# Patient Record
Sex: MALE | Race: OTHER | URBAN - METROPOLITAN AREA
[De-identification: names, ages, dates, MRNs, and addresses within clinical notes are randomized per-mention and may not be internally consistent; named-entity substitution may affect disease eponyms.]

---

## 2023-06-05 ENCOUNTER — OFFICE VISIT (OUTPATIENT)
Dept: FAMILY MEDICINE CLINIC | Facility: CLINIC | Age: 68
End: 2023-06-05

## 2023-06-05 VITALS
BODY MASS INDEX: 31.36 KG/M2 | SYSTOLIC BLOOD PRESSURE: 109 MMHG | DIASTOLIC BLOOD PRESSURE: 72 MMHG | HEIGHT: 71 IN | WEIGHT: 224 LBS | HEART RATE: 99 BPM

## 2023-06-05 DIAGNOSIS — Z00.00 MEDICARE ANNUAL WELLNESS VISIT, SUBSEQUENT: Primary | ICD-10-CM

## 2023-06-05 DIAGNOSIS — E55.9 VITAMIN D DEFICIENCY: ICD-10-CM

## 2023-06-05 DIAGNOSIS — F17.210 CIGARETTE NICOTINE DEPENDENCE WITHOUT COMPLICATION: ICD-10-CM

## 2023-06-05 DIAGNOSIS — Z12.5 PROSTATE CANCER SCREENING: ICD-10-CM

## 2023-06-05 DIAGNOSIS — H71.90 CHOLESTEATOMA, UNSPECIFIED LATERALITY: ICD-10-CM

## 2023-06-05 DIAGNOSIS — H25.9 SENILE CATARACT, UNSPECIFIED AGE-RELATED CATARACT TYPE, UNSPECIFIED LATERALITY: ICD-10-CM

## 2023-06-05 DIAGNOSIS — N40.1 BENIGN PROSTATIC HYPERPLASIA WITH NOCTURIA: ICD-10-CM

## 2023-06-05 DIAGNOSIS — R35.1 BENIGN PROSTATIC HYPERPLASIA WITH NOCTURIA: ICD-10-CM

## 2023-06-05 DIAGNOSIS — Z12.11 COLON CANCER SCREENING: ICD-10-CM

## 2023-06-05 DIAGNOSIS — Z91.81 RISK FOR FALLS: ICD-10-CM

## 2023-06-05 RX ORDER — TAMSULOSIN HYDROCHLORIDE 0.4 MG/1
0.4 CAPSULE ORAL
COMMUNITY
End: 2023-06-05

## 2023-06-05 RX ORDER — TAMSULOSIN HYDROCHLORIDE 0.4 MG/1
0.4 CAPSULE ORAL DAILY
Qty: 90 CAPSULE | Refills: 2 | Status: SHIPPED | OUTPATIENT
Start: 2023-06-05

## 2023-06-08 ENCOUNTER — EKG ENCOUNTER (OUTPATIENT)
Dept: LAB | Age: 68
End: 2023-06-08
Attending: FAMILY MEDICINE
Payer: MEDICARE

## 2023-06-08 ENCOUNTER — LAB ENCOUNTER (OUTPATIENT)
Dept: LAB | Age: 68
End: 2023-06-08
Attending: FAMILY MEDICINE
Payer: MEDICARE

## 2023-06-08 DIAGNOSIS — R35.1 BENIGN PROSTATIC HYPERPLASIA WITH NOCTURIA: ICD-10-CM

## 2023-06-08 DIAGNOSIS — E55.9 VITAMIN D DEFICIENCY: ICD-10-CM

## 2023-06-08 DIAGNOSIS — N40.1 BENIGN PROSTATIC HYPERPLASIA WITH NOCTURIA: ICD-10-CM

## 2023-06-08 DIAGNOSIS — Z00.00 MEDICARE ANNUAL WELLNESS VISIT, SUBSEQUENT: ICD-10-CM

## 2023-06-08 LAB
ALBUMIN SERPL-MCNC: 3.8 G/DL (ref 3.4–5)
ALBUMIN/GLOB SERPL: 1 {RATIO} (ref 1–2)
ALP LIVER SERPL-CCNC: 86 U/L
ALT SERPL-CCNC: 28 U/L
ANION GAP SERPL CALC-SCNC: 4 MMOL/L (ref 0–18)
AST SERPL-CCNC: 27 U/L (ref 15–37)
ATRIAL RATE: 75 BPM
BASOPHILS # BLD AUTO: 0.1 X10(3) UL (ref 0–0.2)
BASOPHILS NFR BLD AUTO: 1.4 %
BILIRUB SERPL-MCNC: 0.9 MG/DL (ref 0.1–2)
BILIRUB UR QL: NEGATIVE
BUN BLD-MCNC: 14 MG/DL (ref 7–18)
BUN/CREAT SERPL: 14.1 (ref 10–20)
CALCIUM BLD-MCNC: 9.4 MG/DL (ref 8.5–10.1)
CHLORIDE SERPL-SCNC: 111 MMOL/L (ref 98–112)
CHOLEST SERPL-MCNC: 183 MG/DL (ref ?–200)
CLARITY UR: CLEAR
CO2 SERPL-SCNC: 29 MMOL/L (ref 21–32)
COLOR UR: YELLOW
CREAT BLD-MCNC: 0.99 MG/DL
DEPRECATED RDW RBC AUTO: 47 FL (ref 35.1–46.3)
EOSINOPHIL # BLD AUTO: 0.31 X10(3) UL (ref 0–0.7)
EOSINOPHIL NFR BLD AUTO: 4.3 %
ERYTHROCYTE [DISTWIDTH] IN BLOOD BY AUTOMATED COUNT: 13.1 % (ref 11–15)
EST. AVERAGE GLUCOSE BLD GHB EST-MCNC: 120 MG/DL (ref 68–126)
FASTING PATIENT LIPID ANSWER: YES
FASTING STATUS PATIENT QL REPORTED: YES
GFR SERPLBLD BASED ON 1.73 SQ M-ARVRAT: 84 ML/MIN/1.73M2 (ref 60–?)
GLOBULIN PLAS-MCNC: 3.7 G/DL (ref 2.8–4.4)
GLUCOSE BLD-MCNC: 88 MG/DL (ref 70–99)
GLUCOSE UR-MCNC: NORMAL MG/DL
HBA1C MFR BLD: 5.8 % (ref ?–5.7)
HCT VFR BLD AUTO: 50.9 %
HDLC SERPL-MCNC: 32 MG/DL (ref 40–59)
HGB BLD-MCNC: 16.6 G/DL
HGB UR QL STRIP.AUTO: NEGATIVE
IMM GRANULOCYTES # BLD AUTO: 0.02 X10(3) UL (ref 0–1)
IMM GRANULOCYTES NFR BLD: 0.3 %
KETONES UR-MCNC: NEGATIVE MG/DL
LDLC SERPL CALC-MCNC: 130 MG/DL (ref ?–100)
LEUKOCYTE ESTERASE UR QL STRIP.AUTO: NEGATIVE
LYMPHOCYTES # BLD AUTO: 2.52 X10(3) UL (ref 1–4)
LYMPHOCYTES NFR BLD AUTO: 34.9 %
MCH RBC QN AUTO: 31.9 PG (ref 26–34)
MCHC RBC AUTO-ENTMCNC: 32.6 G/DL (ref 31–37)
MCV RBC AUTO: 97.9 FL
MONOCYTES # BLD AUTO: 0.81 X10(3) UL (ref 0.1–1)
MONOCYTES NFR BLD AUTO: 11.2 %
NEUTROPHILS # BLD AUTO: 3.47 X10 (3) UL (ref 1.5–7.7)
NEUTROPHILS # BLD AUTO: 3.47 X10(3) UL (ref 1.5–7.7)
NEUTROPHILS NFR BLD AUTO: 47.9 %
NITRITE UR QL STRIP.AUTO: NEGATIVE
NONHDLC SERPL-MCNC: 151 MG/DL (ref ?–130)
OSMOLALITY SERPL CALC.SUM OF ELEC: 298 MOSM/KG (ref 275–295)
P AXIS: 53 DEGREES
P-R INTERVAL: 162 MS
PH UR: 5.5 [PH] (ref 5–8)
PLATELET # BLD AUTO: 255 10(3)UL (ref 150–450)
POTASSIUM SERPL-SCNC: 4.6 MMOL/L (ref 3.5–5.1)
PROT SERPL-MCNC: 7.5 G/DL (ref 6.4–8.2)
PROT UR-MCNC: NEGATIVE MG/DL
PSA SERPL-MCNC: 2.36 NG/ML (ref ?–4)
Q-T INTERVAL: 432 MS
QRS DURATION: 132 MS
QTC CALCULATION (BEZET): 482 MS
R AXIS: -6 DEGREES
RBC # BLD AUTO: 5.2 X10(6)UL
SODIUM SERPL-SCNC: 144 MMOL/L (ref 136–145)
SP GR UR STRIP: 1.02 (ref 1–1.03)
T AXIS: 71 DEGREES
T4 FREE SERPL-MCNC: 1.1 NG/DL (ref 0.8–1.7)
TRIGL SERPL-MCNC: 115 MG/DL (ref 30–149)
TSI SER-ACNC: 3.83 MIU/ML (ref 0.36–3.74)
UROBILINOGEN UR STRIP-ACNC: NORMAL
VENTRICULAR RATE: 75 BPM
VIT D+METAB SERPL-MCNC: 29.2 NG/ML (ref 30–100)
VLDLC SERPL CALC-MCNC: 21 MG/DL (ref 0–30)
WBC # BLD AUTO: 7.2 X10(3) UL (ref 4–11)

## 2023-06-08 PROCEDURE — 36415 COLL VENOUS BLD VENIPUNCTURE: CPT

## 2023-06-08 PROCEDURE — 84443 ASSAY THYROID STIM HORMONE: CPT

## 2023-06-08 PROCEDURE — 85025 COMPLETE CBC W/AUTO DIFF WBC: CPT

## 2023-06-08 PROCEDURE — 93005 ELECTROCARDIOGRAM TRACING: CPT

## 2023-06-08 PROCEDURE — 93010 ELECTROCARDIOGRAM REPORT: CPT | Performed by: INTERNAL MEDICINE

## 2023-06-08 PROCEDURE — 83036 HEMOGLOBIN GLYCOSYLATED A1C: CPT

## 2023-06-08 PROCEDURE — 84153 ASSAY OF PSA TOTAL: CPT

## 2023-06-08 PROCEDURE — 80061 LIPID PANEL: CPT

## 2023-06-08 PROCEDURE — 84439 ASSAY OF FREE THYROXINE: CPT

## 2023-06-08 PROCEDURE — 80053 COMPREHEN METABOLIC PANEL: CPT

## 2023-06-08 PROCEDURE — 82306 VITAMIN D 25 HYDROXY: CPT

## 2023-06-08 RX ORDER — ERGOCALCIFEROL 1.25 MG/1
50000 CAPSULE ORAL WEEKLY
Qty: 12 CAPSULE | Refills: 4 | Status: SHIPPED | OUTPATIENT
Start: 2023-06-08 | End: 2023-07-08

## 2023-08-03 ENCOUNTER — MED REC SCAN ONLY (OUTPATIENT)
Dept: FAMILY MEDICINE CLINIC | Facility: CLINIC | Age: 68
End: 2023-08-03

## 2023-12-14 ENCOUNTER — NURSE TRIAGE (OUTPATIENT)
Dept: FAMILY MEDICINE CLINIC | Facility: CLINIC | Age: 68
End: 2023-12-14

## 2023-12-14 NOTE — TELEPHONE ENCOUNTER
Please reply to pool: EM RN TRIAGE  Action Requested: Summary for Provider     []  Critical Lab, Recommendations Needed  [] Need Additional Advice  [x]   FYI    []   Need Orders  [] Need Medications Sent to Pharmacy  []  Other     SUMMARY: Patient contacts clinic reporting he fell 2 days ago and is now experiencing numbness to both hands and legs. He tripped because his shoes did not fit well,  He fell hitting his head and his knee. Denies loss of consciousness. Now with dizziness and numbness as described. Speech is clear. IC/ER evaluation recommended. Patient states he will have his son take him today at 6:00 pm.  RN advised evaluation ASAP. Dr. Edgardo Cordero.      Reason for call: Numbness  Onset: Data Unavailable                         Reason for Disposition   ACUTE NEUROLOGIC SYMPTOM and now fine    Protocols used: Head Injury-A-[place]

## 2023-12-18 NOTE — TELEPHONE ENCOUNTER
No call back from Pt. Has not read [employer]. No phone response will be mailed per process.

## 2023-12-21 ENCOUNTER — APPOINTMENT (OUTPATIENT)
Dept: CT IMAGING | Facility: HOSPITAL | Age: 68
End: 2023-12-21
Attending: EMERGENCY MEDICINE
Payer: MEDICARE

## 2023-12-21 ENCOUNTER — TELEPHONE (OUTPATIENT)
Dept: FAMILY MEDICINE CLINIC | Facility: CLINIC | Age: 68
End: 2023-12-21

## 2023-12-21 ENCOUNTER — OFFICE VISIT (OUTPATIENT)
Dept: FAMILY MEDICINE CLINIC | Facility: CLINIC | Age: 68
End: 2023-12-21

## 2023-12-21 ENCOUNTER — HOSPITAL ENCOUNTER (EMERGENCY)
Facility: HOSPITAL | Age: 68
Discharge: HOME | End: 2023-12-21
Attending: EMERGENCY MEDICINE
Payer: MEDICARE

## 2023-12-21 VITALS
HEART RATE: 87 BPM | WEIGHT: 210 LBS | TEMPERATURE: 98 F | HEIGHT: 71 IN | BODY MASS INDEX: 29.4 KG/M2 | DIASTOLIC BLOOD PRESSURE: 72 MMHG | RESPIRATION RATE: 18 BRPM | OXYGEN SATURATION: 97 % | SYSTOLIC BLOOD PRESSURE: 125 MMHG

## 2023-12-21 VITALS
DIASTOLIC BLOOD PRESSURE: 79 MMHG | SYSTOLIC BLOOD PRESSURE: 118 MMHG | BODY MASS INDEX: 31 KG/M2 | TEMPERATURE: 98 F | HEART RATE: 81 BPM | WEIGHT: 221 LBS

## 2023-12-21 DIAGNOSIS — R20.2 NUMBNESS AND TINGLING IN BOTH HANDS: ICD-10-CM
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
## 2023-12-21 DIAGNOSIS — T07.XXXA MULTIPLE CONTUSIONS: Primary | ICD-10-CM

## 2023-12-21 DIAGNOSIS — R68.89 FLU-LIKE SYMPTOMS: Primary | ICD-10-CM

## 2023-12-21 DIAGNOSIS — M54.2 NECK PAIN: ICD-10-CM

## 2023-12-21 DIAGNOSIS — R20.0 NUMBNESS AND TINGLING IN BOTH HANDS: ICD-10-CM

## 2023-12-21 LAB
ALBUMIN SERPL-MCNC: 4.5 G/DL (ref 3.2–4.8)
ALBUMIN/GLOB SERPL: 1.6 {RATIO} (ref 1–2)
ALP LIVER SERPL-CCNC: 91 U/L
ALT SERPL-CCNC: 22 U/L
ANION GAP SERPL CALC-SCNC: 10 MMOL/L (ref 0–18)
AST SERPL-CCNC: 23 U/L (ref ?–34)
BASOPHILS # BLD AUTO: 0.11 X10(3) UL (ref 0–0.2)
BASOPHILS NFR BLD AUTO: 1.5 %
BILIRUB SERPL-MCNC: 0.5 MG/DL (ref 0.2–1.1)
BUN BLD-MCNC: 11 MG/DL (ref 9–23)
BUN/CREAT SERPL: 12.4 (ref 10–20)
CALCIUM BLD-MCNC: 9.4 MG/DL (ref 8.7–10.4)
CHLORIDE SERPL-SCNC: 104 MMOL/L (ref 98–112)
CO2 SERPL-SCNC: 25 MMOL/L (ref 21–32)
CREAT BLD-MCNC: 0.89 MG/DL
DEPRECATED RDW RBC AUTO: 46.5 FL (ref 35.1–46.3)
EGFRCR SERPLBLD CKD-EPI 2021: 95 ML/MIN/1.73M2 (ref 60–?)
EOSINOPHIL # BLD AUTO: 0.28 X10(3) UL (ref 0–0.7)
EOSINOPHIL NFR BLD AUTO: 3.8 %
ERYTHROCYTE [DISTWIDTH] IN BLOOD BY AUTOMATED COUNT: 13.2 % (ref 11–15)
GLOBULIN PLAS-MCNC: 2.9 G/DL (ref 2.8–4.4)
GLUCOSE BLD-MCNC: 81 MG/DL (ref 70–99)
HCT VFR BLD AUTO: 48.1 %
HGB BLD-MCNC: 16.8 G/DL
IMM GRANULOCYTES # BLD AUTO: 0.02 X10(3) UL (ref 0–1)
IMM GRANULOCYTES NFR BLD: 0.3 %
LYMPHOCYTES # BLD AUTO: 2.65 X10(3) UL (ref 1–4)
LYMPHOCYTES NFR BLD AUTO: 35.5 %
MCH RBC QN AUTO: 32.9 PG (ref 26–34)
MCHC RBC AUTO-ENTMCNC: 34.9 G/DL (ref 31–37)
MCV RBC AUTO: 94.3 FL
MONOCYTES # BLD AUTO: 0.87 X10(3) UL (ref 0.1–1)
MONOCYTES NFR BLD AUTO: 11.7 %
NEUTROPHILS # BLD AUTO: 3.53 X10 (3) UL (ref 1.5–7.7)
NEUTROPHILS # BLD AUTO: 3.53 X10(3) UL (ref 1.5–7.7)
NEUTROPHILS NFR BLD AUTO: 47.2 %
OSMOLALITY SERPL CALC.SUM OF ELEC: 286 MOSM/KG (ref 275–295)
PLATELET # BLD AUTO: 250 10(3)UL (ref 150–450)
POTASSIUM SERPL-SCNC: 3.9 MMOL/L (ref 3.5–5.1)
PROT SERPL-MCNC: 7.4 G/DL (ref 5.7–8.2)
RBC # BLD AUTO: 5.1 X10(6)UL
SODIUM SERPL-SCNC: 139 MMOL/L (ref 136–145)
WBC # BLD AUTO: 7.5 X10(3) UL (ref 4–11)

## 2023-12-21 PROCEDURE — 85025 COMPLETE CBC W/AUTO DIFF WBC: CPT | Performed by: EMERGENCY MEDICINE

## 2023-12-21 PROCEDURE — 70450 CT HEAD/BRAIN W/O DYE: CPT | Performed by: EMERGENCY MEDICINE

## 2023-12-21 PROCEDURE — 99214 OFFICE O/P EST MOD 30 MIN: CPT

## 2023-12-21 PROCEDURE — 3078F DIAST BP <80 MM HG: CPT

## 2023-12-21 PROCEDURE — 80053 COMPREHEN METABOLIC PANEL: CPT | Performed by: EMERGENCY MEDICINE

## 2023-12-21 PROCEDURE — 99285 EMERGENCY DEPT VISIT HI MDM: CPT

## 2023-12-21 PROCEDURE — 3074F SYST BP LT 130 MM HG: CPT

## 2023-12-21 PROCEDURE — 1159F MED LIST DOCD IN RCRD: CPT

## 2023-12-21 PROCEDURE — 72125 CT NECK SPINE W/O DYE: CPT | Performed by: EMERGENCY MEDICINE

## 2023-12-21 PROCEDURE — 36415 COLL VENOUS BLD VENIPUNCTURE: CPT

## 2023-12-21 NOTE — ASSESSMENT & PLAN NOTE
Patient fell 2 weeks ago wearing soft son shoes who contrecoup injury to right upper forehead and abrasion to right knee. 3 days after fall patient reports numbness and tingling to bilateral hands thumb index and ring finger. C6 dermatome. Neuro eval negative in office Spurling test positive for the right side.

## 2023-12-21 NOTE — TELEPHONE ENCOUNTER
Patient contacts clinic requesting CT orders, seen today. Patient was instructed to proceed to ER for evaluation. Patient verbalized understanding.

## 2023-12-21 NOTE — ASSESSMENT & PLAN NOTE
Patient instructed to proceed to ER. Patient on agreeable at first.  When asked to sign AMA form patient agrees to go to ER. Patient proceed to ER for CT of the head CT C-spine for further evaluation.

## 2023-12-21 NOTE — PATIENT INSTRUCTIONS
You have a viral illness. Stay at home and rest. Avoid close contact with well people in your house so you won't make them sick. Drink plenty of water and other clear liquids to extra fluid loss. Treat fever, cough, and body aches with medicines you can buy at the store. For fever and pain, you may take Tylenol 650 mg every 4-6 hours as needed. If pain is still bothersome you may take 400 mg of ibuprofen every 4-6 hours as needed and rotate with Tylenol. If you have a lot of congestion you can try expectorants like guaifenesin. You can also try over-the-counter cough medicines as well (robitussin). If you stay sick for longer than 1 week please let me.

## 2023-12-21 NOTE — ASSESSMENT & PLAN NOTE
Neck pain s/p fall. Pt instructed to go to ER for eval and imaging. Patient aware of plan of care. All questions answered to satisfaction of the patient. Patient instructed to call office or reach out via [employer] if any issues arise. For urgent issues and/or reviewed red flags please proceed to the urgent care or ER. Also, inform the nurse practitioner with any new symptoms or medication side effects.

## 2023-12-21 NOTE — ED INITIAL ASSESSMENT (HPI)
Pt presents to ED for a fall 2 weeks ago. Pt c/o bilateral leg weakness and bilateral hand tingling x 1.5 weeks after his fall.

## 2023-12-21 NOTE — ED QUICK NOTES
Pt states fell about 2 1/2 weeks ago hitting his head. Denies LOC. States a coupe of days ago began to have weakness in bilateral legs and tingling in his fingers.

## 2023-12-22 LAB
FLUAV + FLUBV RNA SPEC NAA+PROBE: NOT DETECTED
FLUAV + FLUBV RNA SPEC NAA+PROBE: NOT DETECTED
RSV RNA SPEC NAA+PROBE: NOT DETECTED
SARS-COV-2 RNA RESP QL NAA+PROBE: NOT DETECTED

## 2023-12-27 ENCOUNTER — OFFICE VISIT (OUTPATIENT)
Dept: FAMILY MEDICINE CLINIC | Facility: CLINIC | Age: 68
End: 2023-12-27

## 2023-12-27 VITALS
HEIGHT: 71 IN | SYSTOLIC BLOOD PRESSURE: 104 MMHG | DIASTOLIC BLOOD PRESSURE: 65 MMHG | HEART RATE: 76 BPM | BODY MASS INDEX: 31.05 KG/M2 | WEIGHT: 221.81 LBS

## 2023-12-27 DIAGNOSIS — R20.2 PARESTHESIA OF BOTH HANDS: ICD-10-CM

## 2023-12-27 DIAGNOSIS — K05.6 PERIODONTAL DISEASE: ICD-10-CM
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
## 2023-12-27 PROBLEM — J41.0 SMOKERS' COUGH (HCC): Chronic | Status: ACTIVE | Noted: 2023-12-27

## 2023-12-27 PROCEDURE — 1159F MED LIST DOCD IN RCRD: CPT | Performed by: FAMILY MEDICINE

## 2023-12-27 PROCEDURE — 3078F DIAST BP <80 MM HG: CPT | Performed by: FAMILY MEDICINE

## 2023-12-27 PROCEDURE — 3074F SYST BP LT 130 MM HG: CPT | Performed by: FAMILY MEDICINE

## 2023-12-27 PROCEDURE — 3008F BODY MASS INDEX DOCD: CPT | Performed by: FAMILY MEDICINE

## 2023-12-27 PROCEDURE — 1125F AMNT PAIN NOTED PAIN PRSNT: CPT | Performed by: FAMILY MEDICINE

## 2023-12-27 PROCEDURE — 99213 OFFICE O/P EST LOW 20 MIN: CPT | Performed by: FAMILY MEDICINE

## 2024-01-08 ENCOUNTER — PROCEDURE VISIT (OUTPATIENT)
Dept: PHYSICAL MEDICINE AND REHAB | Facility: CLINIC | Age: 69
End: 2024-01-08
Payer: MEDICARE

## 2024-01-08 DIAGNOSIS — G56.13 MEDIAN NEUROPATHY OF BOTH UPPER EXTREMITIES: ICD-10-CM

## 2024-01-08 DIAGNOSIS — M54.12 CERVICAL RADICULOPATHY: Primary | ICD-10-CM

## 2024-01-08 PROCEDURE — 95886 MUSC TEST DONE W/N TEST COMP: CPT | Performed by: PHYSICAL MEDICINE & REHABILITATION

## 2024-01-08 PROCEDURE — 95910 NRV CNDJ TEST 7-8 STUDIES: CPT | Performed by: PHYSICAL MEDICINE & REHABILITATION
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
## 2024-01-09 PROBLEM — M54.12 CERVICAL RADICULOPATHY: Status: ACTIVE | Noted: 2024-01-09

## 2024-01-15 ENCOUNTER — OFFICE VISIT (OUTPATIENT)
Dept: FAMILY MEDICINE CLINIC | Facility: CLINIC | Age: 69
End: 2024-01-15
Payer: MEDICARE

## 2024-01-15 VITALS
HEART RATE: 85 BPM | BODY MASS INDEX: 30.8 KG/M2 | DIASTOLIC BLOOD PRESSURE: 78 MMHG | SYSTOLIC BLOOD PRESSURE: 122 MMHG | WEIGHT: 220 LBS | HEIGHT: 71 IN

## 2024-01-15 DIAGNOSIS — M54.41 ACUTE RIGHT-SIDED LOW BACK PAIN WITH RIGHT-SIDED SCIATICA: ICD-10-CM

## 2024-01-15 DIAGNOSIS — G56.03 BILATERAL CARPAL TUNNEL SYNDROME: Primary | ICD-10-CM

## 2024-01-15 PROCEDURE — L3908 WHO COCK-UP NONMOLDE PRE OTS: HCPCS | Performed by: FAMILY MEDICINE

## 2024-01-15 PROCEDURE — 3008F BODY MASS INDEX DOCD: CPT | Performed by: FAMILY MEDICINE

## 2024-01-15 PROCEDURE — 1159F MED LIST DOCD IN RCRD: CPT | Performed by: FAMILY MEDICINE

## 2024-01-15 PROCEDURE — 99213 OFFICE O/P EST LOW 20 MIN: CPT | Performed by: FAMILY MEDICINE

## 2024-01-15 PROCEDURE — 3074F SYST BP LT 130 MM HG: CPT | Performed by: FAMILY MEDICINE

## 2024-01-15 PROCEDURE — 3078F DIAST BP <80 MM HG: CPT | Performed by: FAMILY MEDICINE

## 2024-01-15 PROCEDURE — 1126F AMNT PAIN NOTED NONE PRSNT: CPT | Performed by: FAMILY MEDICINE

## 2024-01-15 RX ORDER — NAPROXEN 250 MG/1
250 TABLET ORAL 2 TIMES DAILY WITH MEALS
Qty: 60 TABLET | Refills: 0 | Status: SHIPPED | OUTPATIENT
Start: 2024-01-15

## 2024-01-15 NOTE — PROGRESS NOTES
1/15/2024  1:57 PM    Russell Ellis is a 68 year old male.    Chief complaint(s):   Chief Complaint   Patient presents with    Follow - Up     HPI:     Russell Ellis primary complaint is regarding Back - leg pain.       Patient is a 68-year-old male who presents for follow-up regarding bilateral upper extremity paresthesias and pains which has been pronounced most in the past month.  It seems that the right is worse than the left.  Recent EMG confirmed carpal tunnel syndrome.  However, he is not complaining that he has more of a back pain which is diffusely and it radiates to the right leg.  Patient was given the option of a wrist injection but he declined.    EMG Impression:  1.  Abnormal study.  2.  Electrodiagnostic evidence is consistent with bilateral median neuropathy at the wrist.  This is characterized by demyelination of sensory and motor fibers.  No axon loss.  3.  Electrodiagnostic evidence is suggestive but not diagnostic for chronic cervical polyradiculopathy.HISTORY:  History reviewed. No pertinent past medical history.   History reviewed. No pertinent surgical history.   History reviewed. No pertinent family history.   Social History:   Social History     Socioeconomic History    Marital status:    Tobacco Use    Smoking status: Some Days     Types: Cigarettes    Smokeless tobacco: Never   Substance and Sexual Activity    Alcohol use: Never        Immunizations:   Immunization History   Administered Date(s) Administered    Covid-19 Vaccine Moderna 100 mcg/0.5 ml 04/16/2021, 05/14/2021    Covid-19 Vaccine Moderna 50 Mcg/0.25 Ml 02/05/2022    Pneumococcal Conjugate PCV20 06/05/2023       Medications (Active prior to today's visit):  Current Outpatient Medications   Medication Sig Dispense Refill    naproxen 250 MG Oral Tab Take 1 tablet (250 mg total) by mouth 2 (two) times daily with meals. 60 tablet 0    tamsulosin 0.4 MG Oral Cap Take 1 capsule (0.4 mg total) by mouth daily. (Patient  not taking: Reported on 1/15/2024) 90 capsule 2       Allergies:  No Known Allergies      ROS:   Review of Systems   Constitutional:  Negative for appetite change, fatigue and fever.   Respiratory:  Negative for shortness of breath.    Cardiovascular:  Negative for chest pain.   Gastrointestinal:  Negative for abdominal pain.   Musculoskeletal:  Positive for back pain. Negative for myalgias.        UEs   Skin:  Negative for rash.   Neurological:  Positive for numbness (UEs). Negative for dizziness, weakness and headaches.       PHYSICAL EXAM:   VS: /78   Pulse 85   Ht 5' 11\" (1.803 m)   Wt 220 lb (99.8 kg)   BMI 30.68 kg/m²     Physical Exam  Vitals reviewed.   Constitutional:       Appearance: Normal appearance. He is well-developed.   HENT:      Head: Normocephalic.   Eyes:      General: No scleral icterus.     Conjunctiva/sclera: Conjunctivae normal.   Cardiovascular:      Rate and Rhythm: Normal rate.   Pulmonary:      Effort: Pulmonary effort is normal.   Musculoskeletal:      Cervical back: Neck supple.   Skin:     Findings: No rash.   Psychiatric:         Mood and Affect: Mood normal.         LABORATORY RESULTS:     EKG / Spirometry : -     Radiology: CT SPINE CERVICAL (CPT=72125)    Result Date: 12/21/2023  PROCEDURE: CT SPINE CERVICAL (CPT=72125)  COMPARISON: None.  INDICATIONS: fall/numbness in fingers R hand  TECHNIQUE:   Multi-planar CT images were obtained without intravenous contrast material.  Automated exposure control for dose reduction was used. Adjustment of the mA and/or kV was done based on the patient's size. Use of iterative reconstruction technique for dose reduction was used.  Dose information is transmitted to the ACR (American College of Radiology) NRDR (National Radiology Data Registry) which includes the Dose Index Registry.  Counting reference:  Craniocervical junction.   FINDINGS:  CRANIOCERVICAL AREA:   Normal foramen magnum with no Chiari malformation. PARASPINAL  AREA: Well-corticated osseous densities in the posterior paraspinal musculature, sequela of remote injury. BONES: There are hypertrophic changes of the uncovertebral and facet joints bilaterally. Small anterior endplate osteophytes seen within the cervical vertebral bodies. There is no acute fracture or dislocation. ALIGNMENT: There is loss of the cervical lordosis can be secondary from muscle spasm or positioning.  CERVICAL DISC LEVELS:  Multifocal areas of mild to moderate canal and foraminal narrowing secondary to degenerative facet, uncovertebral and disc disease.  No high-grade canal or foraminal narrowing.         CONCLUSION:   Degenerative disc, facet, uncovertebral joint disease throughout the cervical spine without acute fracture or dislocation..    Dictated by (CST): Rajiv Vasquez MD on 12/21/2023 at 5:59 PM     Finalized by (CST): Rajiv Vasquez MD on 12/21/2023 at 6:01 PM          CT BRAIN OR HEAD (66483)    Result Date: 12/21/2023  PROCEDURE: CT BRAIN OR HEAD (CPT=70450)  COMPARISON: None.  INDICATIONS: Loss of balance resulting in a fall.  fall/numbness in fingers R hand  TECHNIQUE: CT images were obtained without contrast material.  Automated exposure control for dose reduction was used.  Dose information is transmitted to the ACR (American College of Radiology) NRDR (National Radiology Data Registry) which includes the Dose Index Registry.  FINDINGS:  CSF SPACES: Ventricles, cisterns, and sulci are appropriate for age.  No hydrocephalus, subarachnoid hemorrhage, or mass.  No midline shift.  CEREBRUM: No edema, hemorrhage, mass, acute infarction, or significant atrophy.  CEREBELLUM: No edema, hemorrhage, mass, acute infarction, or significant atrophy.  BRAINSTEM: No edema, hemorrhage, mass, acute infarction, or significant atrophy.  CALVARIUM: No apparent fracture, mass, or other significant visible lesion.  SINUSES: Bilateral mastoidectomies.  Chronic mucoperiosteal thickening involving both ethmoid  sinuses, frontal sinus recess, sphenoid sinus, and bilateral maxillary sinus. ORBITS: Previous bilateral cataract surgery.  OTHER: Dystrophic dural calcifications.  Periodontal disease with a periapical abscess associated with a right posterior molar and erosive defect in the floor of the right maxillary sinus.  A fixation plate right clavicle seen on  view.         CONCLUSION:  1. No acute intracranial finding. 2. 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 3. Chronic pansinusitis. 4. Prior bilateral mastoidectomies.    Dictated by (CST): Yasmany Aj MD on 12/21/2023 at 4:55 PM     Finalized by (CST): Yasmany Aj MD on 12/21/2023 at 5:00 PM             ASSESSMENT/PLAN:   Assessment   Encounter Diagnoses   Name Primary?    Bilateral carpal tunnel syndrome Yes    Acute right-sided low back pain with right-sided sciatica        Declined corticoid steroid injection to wrist area    MEDICATIONS:     Requested Prescriptions     Signed Prescriptions Disp Refills    naproxen 250 MG Oral Tab 60 tablet 0     Sig: Take 1 tablet (250 mg total) by mouth 2 (two) times daily with meals.     REFERRALS: PHYSIATRY - INTERNAL,       Procedures    PHYSIATRY - INTERNAL        RECOMMENDATIONS given include: Patient was reassured of  his medical condition and all questions and concerns were answered. Patient was informed to please, call our office with any new or further questions or concerns that may come up in the near future. Notify Dr Fajardo or the [facility] if there is a deterioration or worsening of the medical condition. Also, inform the doctor with any new symptoms or medications' side effects.  Use wrist support given all day.     FOLLOW-UP: Schedule a follow-up visit in  prn.            Orders This Visit:  No orders of the defined types were placed in this encounter.      Meds This Visit:  Requested Prescriptions     Signed Prescriptions Disp  Refills    naproxen 250 MG Oral Tab 60 tablet 0     Sig: Take 1 tablet (250 mg total) by mouth 2 (two) times daily with meals.       Imaging & Referrals:  PHYSIATRY - INTERNAL         JAZIEL NEELY MD

## 2024-01-31 ENCOUNTER — OFFICE VISIT (OUTPATIENT)
Dept: PHYSICAL MEDICINE AND REHAB | Facility: CLINIC | Age: 69
End: 2024-01-31
Payer: MEDICARE

## 2024-01-31 VITALS — OXYGEN SATURATION: 96 % | BODY MASS INDEX: 35.36 KG/M2 | WEIGHT: 220 LBS | HEIGHT: 66 IN

## 2024-01-31 DIAGNOSIS — S13.9XXA NECK SPRAIN, INITIAL ENCOUNTER: Primary | ICD-10-CM

## 2024-01-31 DIAGNOSIS — R20.2 PARESTHESIAS: ICD-10-CM

## 2024-01-31 PROCEDURE — 1159F MED LIST DOCD IN RCRD: CPT | Performed by: PHYSICAL MEDICINE & REHABILITATION

## 2024-01-31 PROCEDURE — 1126F AMNT PAIN NOTED NONE PRSNT: CPT | Performed by: PHYSICAL MEDICINE & REHABILITATION

## 2024-01-31 PROCEDURE — 99214 OFFICE O/P EST MOD 30 MIN: CPT | Performed by: PHYSICAL MEDICINE & REHABILITATION

## 2024-01-31 PROCEDURE — 3008F BODY MASS INDEX DOCD: CPT | Performed by: PHYSICAL MEDICINE & REHABILITATION
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
## 2024-02-12 RX ORDER — NAPROXEN 250 MG/1
250 TABLET ORAL 2 TIMES DAILY WITH MEALS
Qty: 180 TABLET | Refills: 0 | Status: SHIPPED | OUTPATIENT
Start: 2024-02-12

## 2024-02-12 NOTE — TELEPHONE ENCOUNTER
Refill passed per [facility] protocol.  Requested Prescriptions   Pending Prescriptions Disp Refills    NAPROXEN 250 MG Oral Tab [Pharmacy Med Name: NAPROXEN 250 MG TABLET] 60 tablet 0     Sig: TAKE 1 TABLET BY MOUTH 2 TIMES DAILY WITH MEALS.       Non-Narcotic Pain Medication Protocol Passed - 2/11/2024  2:19 AM        Passed - In person appointment or virtual visit in the past 6 mos or appointment in next 3 mos     Recent Outpatient Visits              1 week ago [facility]    Office Visit    4 weeks ago Bilateral carpal tunnel syndrome    [facility]    Office Visit    1 month ago [facility], [facility]    Office Visit    1 month ago [facility]-like symptoms    [facility]    Office Visit    8 months ago Medicare annual wellness visit, subsequent    [facility] Ric[facility]    Office Visit          Future Appointments         Provider Department Appt Notes    In 1 week Elias Garcia APRN [facility], [address], [place] pre op                  Recent Outpatient Visits              1 week ago Neck sprain, initial encounter    [facility], La[facility] Santiago MD    Office Visit    4 weeks ago Bilateral carpal tunnel syndrome    [facility] Ric[facility]    Office Visit    1 month ago Fall, initial encounter    [facility] Ric[facility]    Office Visit    1 month ago Flu-like symptoms    [facility]    Office Visit    8 months ago Medicare annual wellness visit,  subsequent    [facility]    Office Visit          Future Appointments         Provider Department Appt Notes    In 1 week Elias Garcia, [facility], [address], [place] pre op

## 2024-02-18 PROBLEM — Z01.818 PREOPERATIVE EXAMINATION, UNSPECIFIED: Status: ACTIVE | Noted: 2024-02-18

## 2024-02-18 NOTE — PROGRESS NOTES
Russell Ellis is a 68 year old male who presents for a pre-operative physical exam.   Russell Ellis is scheduled for a anterior cervical discectomy with fusion C3-C6 procedure at [facility] on 03/21/24 performed by Dr Rancho Wynn. Indication: Cervical spondylotic myelopathy with myelomalacia    HPI related to surgery:   BPH and bilateral CTS.    He  has had previous anesthesia:  Yes.  Previous complications:  No    Social History     Socioeconomic History    Marital status:    Tobacco Use    Smoking status: Some Days     Types: Cigarettes    Smokeless tobacco: Never   Substance and Sexual Activity    Alcohol use: Never      History reviewed. No pertinent past medical history.  History reviewed. No pertinent surgical history.  Allergies: No Known Allergies  Current Outpatient Medications   Medication Sig Dispense Refill    methylPREDNISolone (MEDROL) 4 MG Oral Tablet Therapy Pack As directed. 21 each 0    naproxen 250 MG Oral Tab Take 1 tablet (250 mg total) by mouth 2 (two) times daily with meals. (Patient not taking: Reported on 2/19/2024) 180 tablet 0    tamsulosin 0.4 MG Oral Cap Take 1 capsule (0.4 mg total) by mouth daily. (Patient not taking: Reported on 1/15/2024) 90 capsule 2        REVIEW OF SYSTEMS:   Negative, except per HPI.     PHYSICAL EXAM:   /82   Pulse 78   Ht 5' 6\" (1.676 m)   Wt 218 lb (98.9 kg)   BMI 35.19 kg/m²    General appearance: alert and orient x3  HEENT: Normocephalic, without obvious abnormality, atraumatic. PERRL, EOMI, pink conjunctiva.   Neck: supple, symmetrical, trachea midline, no adenopathy, no JVD and thyroid not enlarged, symmetric, no tenderness/mass/nodules  Lungs: respirations unlabored, clear to auscultation bilaterally  Heart: regular rate and rhythm, S1, S2 normal, no murmur  Abdomen: normoactive bowel sounds x4; soft, non-distended; nontender; no masses  Extremities: extremities normal, atraumatic, no cyanosis or edema; no erythema or  tenderness in calves bilaterally  Pulses: 2+ and symmetric  Neurologic: alert, oriented x3; CN II-XII intact; no focal deficits.  Baseline numbness and weakness to BUE. Pain to right anterior upper arm in c5 dermatome. Patient with right sided sciatica pain down right leg. Positive straight leg raise right. Weak dorsiflexion great toe right side.     LABORATORY DATA:   See attached    ASSESSMENT AND PLAN:   Russell Ellis has no significant history of cardiac or pulmonary conditions.   He is a good surgical candidate and is medically cleared for surgery by myself and collaborating physician Dr. Megha Gusman.  This consult was sent back the referring physician, Dr. Rancho Wynn.    Assessment:  Encounter Diagnoses   Name Primary?    Preoperative examination, unspecified Yes    Cervical radiculopathy     Acute right-sided low back pain with right-sided sciatica      1. Preoperative examination, unspecified  - Comp Metabolic Panel (14); Future  - CBC With Differential With Platelet; Future  - Prothrombin Time (PT) [E]; Future  - PTT, Activated [E]; Future  - EKG 12 Lead; Future  - UA/M WITH CULTURE REFLEX [3020]  - XR CHEST AP/PA (1 VIEW) (CPT=71045); Future    CMP, CBC with dif, Pt, INR, PTT, UA, EKG, CXR and physical exam all completed in office today. Await results to clear.  Per paperwork, patient to obtain Type and screen at location of surgery. Levy bob will call to schedule.   PCP has no preference, consult surgeon's choice of hospitalist.     2. Cervical radiculopathy  CPM with neurosurgeon Tianna.  Plan for ACDF C3-C6    3. Acute right-sided low back pain with right-sided sciatica  Patient with C5 right arm pain and right sided sciatica pain.  Positive straight leg raise right  Weak dorsiflexion of right great toe.  Medrol dose pack x1  - methylPREDNISolone (MEDROL) 4 MG Oral Tablet Therapy Pack; As directed.  Dispense: 21 each; Refill: 0     Plan   Orders Placed This Encounter   Procedures     Comp Metabolic Panel (14)    CBC With Differential With Platelet    Prothrombin Time (PT) [E]    PTT, Activated [E]    UA/M WITH CULTURE REFLEX [3020]         Elias Garcia, APRN

## 2024-02-19 ENCOUNTER — OFFICE VISIT (OUTPATIENT)
Dept: FAMILY MEDICINE CLINIC | Facility: CLINIC | Age: 69
End: 2024-02-19

## 2024-02-19 VITALS
WEIGHT: 218 LBS | BODY MASS INDEX: 35.03 KG/M2 | HEART RATE: 78 BPM | DIASTOLIC BLOOD PRESSURE: 82 MMHG | SYSTOLIC BLOOD PRESSURE: 120 MMHG | HEIGHT: 66 IN

## 2024-02-19 DIAGNOSIS — Z01.818 PREOPERATIVE EXAMINATION, UNSPECIFIED: Primary | ICD-10-CM

## 2024-02-19 DIAGNOSIS — M54.41 ACUTE RIGHT-SIDED LOW BACK PAIN WITH RIGHT-SIDED SCIATICA: ICD-10-CM

## 2024-02-19 DIAGNOSIS — M54.12 CERVICAL RADICULOPATHY: ICD-10-CM

## 2024-02-19 PROCEDURE — 99214 OFFICE O/P EST MOD 30 MIN: CPT

## 2024-02-19 PROCEDURE — 1160F RVW MEDS BY RX/DR IN RCRD: CPT

## 2024-02-19 PROCEDURE — 3008F BODY MASS INDEX DOCD: CPT

## 2024-02-19 PROCEDURE — 3074F SYST BP LT 130 MM HG: CPT

## 2024-02-19 PROCEDURE — 3079F DIAST BP 80-89 MM HG: CPT

## 2024-02-19 PROCEDURE — 1159F MED LIST DOCD IN RCRD: CPT

## 2024-02-19 RX ORDER — METHYLPREDNISOLONE 4 MG/1
TABLET ORAL
Qty: 21 EACH | Refills: 0 | Status: SHIPPED | OUTPATIENT
Start: 2024-02-19

## 2024-02-19 NOTE — ASSESSMENT & PLAN NOTE
Patient with C5 right arm pain and right sided sciatica pain.  Positive straight leg raise right  Weak dorsiflexion of right great toe.  Medrol dose pack x1.

## 2024-02-19 NOTE — ASSESSMENT & PLAN NOTE
CMP, CBC with dif, Pt, INR, PTT, UA, EKG, CXR and physical exam all completed in office today. Await results to clear.  Per paperwork, patient to obtain Type and screen at location of surgery. Levy bob will call to schedule.   PCP has no preference, consult surgeon's choice of hospitalist.

## 2024-02-27 ENCOUNTER — NURSE TRIAGE (OUTPATIENT)
Dept: FAMILY MEDICINE CLINIC | Facility: CLINIC | Age: 69
End: 2024-02-27

## 2024-02-27 NOTE — TELEPHONE ENCOUNTER
Patient notified with understanding.  He will try naproxen.  Advised to stop 1 week prior to surgery.

## 2024-02-27 NOTE — TELEPHONE ENCOUNTER
Patient is scheduled for a anterior cervical discectomy with fusion C3-C6 procedure at [facility] on 03/21/24. Would recommend Naproxen first. Too soon to do an additional medrol back. Would reach out to neurosurgeon if they have any recommendations. We could always add a muscle relaxer if needed.    Thanks!

## 2024-02-27 NOTE — TELEPHONE ENCOUNTER
Please reply to pool: EM RN TRIAGE  Action Requested: Summary for Provider     []  Critical Lab, Recommendations Needed  [] Need Additional Advice  [x]   FYI    []   Need Orders  [] Need Medications Sent to Pharmacy  []  Other     SUMMARY: Patient contacts clinic reporting back and leg pain.  Seen 02/19 and prescribed a medrol dose pack which helped but has been finished for 2 days and pain has returned.  It is making him anxious. He is not taking naproxen. He plans to do MRI next week.  Inquires if medrol dose pack can be refilled or other suggestion.    Reason for call: Back Pain  Onset: Data Unavailable                       Reason for Disposition   Pain radiates into the thigh or further down the leg    Protocols used: Back Pain-A-[place]

## 2024-03-01 ENCOUNTER — HOSPITAL ENCOUNTER (OUTPATIENT)
Dept: GENERAL RADIOLOGY | Age: 69
Discharge: HOME | End: 2024-03-01
Payer: MEDICARE

## 2024-03-01 ENCOUNTER — LAB ENCOUNTER (OUTPATIENT)
Dept: LAB | Age: 69
End: 2024-03-01
Payer: MEDICARE

## 2024-03-01 ENCOUNTER — EKG ENCOUNTER (OUTPATIENT)
Dept: LAB | Age: 69
End: 2024-03-01
Payer: MEDICARE

## 2024-03-01 DIAGNOSIS — Z01.818 PREOPERATIVE EXAMINATION, UNSPECIFIED: ICD-10-CM

## 2024-03-01 LAB
ALBUMIN SERPL-MCNC: 4.2 G/DL (ref 3.2–4.8)
ALBUMIN/GLOB SERPL: 1.4 {RATIO} (ref 1–2)
ALP LIVER SERPL-CCNC: 85 U/L
ALT SERPL-CCNC: 19 U/L
ANION GAP SERPL CALC-SCNC: 5 MMOL/L (ref 0–18)
APTT PPP: 34.2 SECONDS (ref 23.3–35.6)
AST SERPL-CCNC: 20 U/L (ref ?–34)
ATRIAL RATE: 69 BPM
BASOPHILS # BLD AUTO: 0.09 X10(3) UL (ref 0–0.2)
BASOPHILS NFR BLD AUTO: 1.3 %
BILIRUB SERPL-MCNC: 0.8 MG/DL (ref 0.2–1.1)
BILIRUB UR QL: NEGATIVE
BUN BLD-MCNC: 11 MG/DL (ref 9–23)
BUN/CREAT SERPL: 10.7 (ref 10–20)
CALCIUM BLD-MCNC: 9.3 MG/DL (ref 8.7–10.4)
CHLORIDE SERPL-SCNC: 107 MMOL/L (ref 98–112)
CLARITY UR: CLEAR
CO2 SERPL-SCNC: 28 MMOL/L (ref 21–32)
CREAT BLD-MCNC: 1.03 MG/DL
DEPRECATED RDW RBC AUTO: 47.8 FL (ref 35.1–46.3)
EGFRCR SERPLBLD CKD-EPI 2021: 80 ML/MIN/1.73M2 (ref 60–?)
EOSINOPHIL # BLD AUTO: 0.25 X10(3) UL (ref 0–0.7)
EOSINOPHIL NFR BLD AUTO: 3.6 %
ERYTHROCYTE [DISTWIDTH] IN BLOOD BY AUTOMATED COUNT: 13.4 % (ref 11–15)
FASTING STATUS PATIENT QL REPORTED: YES
GLOBULIN PLAS-MCNC: 3.1 G/DL (ref 2.8–4.4)
GLUCOSE BLD-MCNC: 87 MG/DL (ref 70–99)
GLUCOSE UR-MCNC: NORMAL MG/DL
HCT VFR BLD AUTO: 49.3 %
HGB BLD-MCNC: 16.3 G/DL
HGB UR QL STRIP.AUTO: NEGATIVE
IMM GRANULOCYTES # BLD AUTO: 0.02 X10(3) UL (ref 0–1)
IMM GRANULOCYTES NFR BLD: 0.3 %
INR BLD: 0.95 (ref 0.8–1.2)
KETONES UR-MCNC: NEGATIVE MG/DL
LEUKOCYTE ESTERASE UR QL STRIP.AUTO: NEGATIVE
LYMPHOCYTES # BLD AUTO: 2.55 X10(3) UL (ref 1–4)
LYMPHOCYTES NFR BLD AUTO: 37.2 %
MCH RBC QN AUTO: 32.1 PG (ref 26–34)
MCHC RBC AUTO-ENTMCNC: 33.1 G/DL (ref 31–37)
MCV RBC AUTO: 97 FL
MONOCYTES # BLD AUTO: 0.72 X10(3) UL (ref 0.1–1)
MONOCYTES NFR BLD AUTO: 10.5 %
NEUTROPHILS # BLD AUTO: 3.23 X10 (3) UL (ref 1.5–7.7)
NEUTROPHILS # BLD AUTO: 3.23 X10(3) UL (ref 1.5–7.7)
NEUTROPHILS NFR BLD AUTO: 47.1 %
NITRITE UR QL STRIP.AUTO: NEGATIVE
OSMOLALITY SERPL CALC.SUM OF ELEC: 289 MOSM/KG (ref 275–295)
P AXIS: 50 DEGREES
P-R INTERVAL: 168 MS
PH UR: 6 [PH] (ref 5–8)
PLATELET # BLD AUTO: 247 10(3)UL (ref 150–450)
POTASSIUM SERPL-SCNC: 4.8 MMOL/L (ref 3.5–5.1)
PROT SERPL-MCNC: 7.3 G/DL (ref 5.7–8.2)
PROT UR-MCNC: NEGATIVE MG/DL
PROTHROMBIN TIME: 13.3 SECONDS (ref 11.6–14.8)
Q-T INTERVAL: 446 MS
QRS DURATION: 136 MS
QTC CALCULATION (BEZET): 477 MS
R AXIS: -28 DEGREES
RBC # BLD AUTO: 5.08 X10(6)UL
SODIUM SERPL-SCNC: 140 MMOL/L (ref 136–145)
SP GR UR STRIP: 1.01 (ref 1–1.03)
T AXIS: 99 DEGREES
UROBILINOGEN UR STRIP-ACNC: NORMAL
VENTRICULAR RATE: 69 BPM
WBC # BLD AUTO: 6.9 X10(3) UL (ref 4–11)

## 2024-03-01 PROCEDURE — 93010 ELECTROCARDIOGRAM REPORT: CPT | Performed by: INTERNAL MEDICINE

## 2024-03-01 PROCEDURE — 71046 X-RAY EXAM CHEST 2 VIEWS: CPT

## 2024-03-01 PROCEDURE — 36415 COLL VENOUS BLD VENIPUNCTURE: CPT

## 2024-03-01 PROCEDURE — 85610 PROTHROMBIN TIME: CPT

## 2024-03-01 PROCEDURE — 85730 THROMBOPLASTIN TIME PARTIAL: CPT

## 2024-03-01 PROCEDURE — 81003 URINALYSIS AUTO W/O SCOPE: CPT

## 2024-03-01 PROCEDURE — 93005 ELECTROCARDIOGRAM TRACING: CPT

## 2024-03-01 PROCEDURE — 85025 COMPLETE CBC W/AUTO DIFF WBC: CPT

## 2024-03-01 PROCEDURE — 80053 COMPREHEN METABOLIC PANEL: CPT

## 2024-03-15 ENCOUNTER — TELEPHONE (OUTPATIENT)
Dept: FAMILY MEDICINE CLINIC | Facility: CLINIC | Age: 69
End: 2024-03-15

## 2024-03-15 NOTE — TELEPHONE ENCOUNTER
Aidan from pre surgery at [facility] called and is requesting documentation they needs for patients surgery, is requesting medical history and physical Signed EKG    FAX: [phone]

## 2024-03-18 NOTE — TELEPHONE ENCOUNTER
Nevin from Dr. Wynn's , surgeon's office at [facility], is requesting the EKG Tracing for patient scheduled for surgery this Thursday, 3/21/24.    Rancho Rasmussen  Surgeon    FAX # [phone]  Phone # [phone]    Please advise

## 2024-03-19 NOTE — TELEPHONE ENCOUNTER
Romina from pre-admissions @ [facility] called stating they received the optimization form and they need the chest x-ray from 03/01/2024 and she needs  to sign the x-ray and fax it back to them. She states patient is scheduled for surgery on 03/21/2024.    Fax#: [phone]

## 2024-03-20 NOTE — TELEPHONE ENCOUNTER
Romina from pre-admission at [facility] calling to request update on below, stated patient has surgery tomorrow.

## 2024-03-25 RX ORDER — TAMSULOSIN HYDROCHLORIDE 0.4 MG/1
0.4 CAPSULE ORAL DAILY
Qty: 90 CAPSULE | Refills: 3 | Status: SHIPPED | OUTPATIENT
Start: 2024-03-25

## 2024-03-25 RX ORDER — IBUPROFEN 600 MG/1
600 TABLET ORAL EVERY 6 HOURS PRN
Refills: 0 | Status: CANCELLED | OUTPATIENT
Start: 2024-03-25

## 2024-03-25 NOTE — PROGRESS NOTES
Subjective:   Russell Ellis is a 69 year old male who presents for Surgical Followup (Check throat, had surgery last Thursday) and Nerves (Right arm, past year, )   Patient is a pleasant 69-year-old male with past medical history significant for BPH.  Patient presents to office today for evaluation of throat pain after surgery. Patient also having some arm pain to right arm since surgery. Throat pain is getting better but irritated in back of throat. Strength and Numbness and tingling improving but pain in right biceps since surgery.     Of note patient recently had anterior cervical discectomy with fusion C3-C6 procedure at [facility] on 03/21/24 performed by Dr Rancho Wynn. Howd that go? Everything is going well. Has follow up on 04/02/24.  Are you doing PT? Not started yet. Will ask about at follow up appointment. N NSAIDs, taking oxy 5mg qid prn and cyclobenzaprine 10mg tid x 7days.    Need calcium Ct score, tortuous aorta and cardiomegaly on cxr from preop.   ASCVD 19.7%  The 10-year ASCVD risk score (Girish COBIAN, et al., 2019) is: 20.5%    Values used to calculate the score:      Age: 69 years      Sex: Male      Is Non- : No      Diabetic: No      Tobacco smoker: Yes      Systolic Blood Pressure: 120 mmHg      Is BP treated: No      HDL Cholesterol: 32 mg/dL      Total Cholesterol: 183 mg/dL              History reviewed. No pertinent past medical history.   History reviewed. No pertinent surgical history.     History/Other:    Chief Complaint Reviewed and Verified  Nursing Notes Reviewed and   Verified  Tobacco Reviewed  Allergies Reviewed  Medications Reviewed    Problem List Reviewed  Medical History Reviewed  Surgical History   Reviewed  Family History Reviewed  Social History Reviewed         Tobacco:  He currently smokes tobacco.  Social History    Tobacco Use      Smoking status: Some Days        Types: Cigarettes      Smokeless tobacco: Never     Tobacco  Cessation Documentation (Smoking and Smokeless included):  I had an in depth therapy session with Russell Ellis about his tobacco use risks and options using the USPSTF's Five A's approach:    Ask: Russell is using tobacco products.  Assess: We asked about/assessed behavioral health risk and factors affecting choice of behavior change goals/methods.  Specifically I asked about readiness to quit tobacco.  Advise: We gave clear, specific, and personalized behavior change advice, including information about personal health harms and benefits. Specifically, he was told that Quitting tobacco is one of the best things he can do for his health. I strongly encouraged him to quit.      Agree: We collaboratively selected appropriate treatment goals and methods based on the patient’s interest in and willingness to change the behavior.   Assist: We used behavior change techniques (self-help and/or counseling), to aid Russell in achieving agreed-upon goals by acquiring the skills, confidence, and social/environmental supports for behavior change, supplemented with adjunctive medical treatments when appropriate. Additionally, national quitting tobacco aides were discussed.   Arrange: Follow up at next visit- see specific follow up below.    Time Counseled: <1 minutes       Current Outpatient Medications   Medication Sig Dispense Refill    oxyCODONE 5 MG Oral Tab       cyclobenzaprine 5 MG Oral Tab       Benzocaine-Menthol (CEPACOL) 15-2.3 MG Mouth/Throat Lozenge Use as directed 1 lozenge in the mouth or throat every 4 (four) hours as needed. 30 lozenge 0    tamsulosin 0.4 MG Oral Cap Take 1 capsule (0.4 mg total) by mouth daily. 90 capsule 3    methylPREDNISolone (MEDROL) 4 MG Oral Tablet Therapy Pack As directed. 21 each 0    naproxen 250 MG Oral Tab Take 1 tablet (250 mg total) by mouth 2 (two) times daily with meals. (Patient not taking: Reported on 2/19/2024) 180 tablet 0         Review of Systems:  Review of Systems    Constitutional: Negative.  Negative for activity change, chills and fever.   HENT:  Positive for sore throat. Negative for congestion, ear pain, postnasal drip, sinus pain and trouble swallowing.    Respiratory: Negative.  Negative for cough, shortness of breath and wheezing.    Cardiovascular: Negative.  Negative for chest pain and leg swelling.   Gastrointestinal: Negative.  Negative for abdominal pain, blood in stool, constipation and diarrhea.   Endocrine: Negative.    Genitourinary: Negative.  Negative for difficulty urinating, dysuria and flank pain.   Musculoskeletal:  Positive for arthralgias and myalgias. Negative for back pain and neck stiffness.   Skin: Negative.  Negative for color change and rash.   Neurological: Negative.  Negative for dizziness and headaches.   Hematological:  Negative for adenopathy.         Objective:   /77 (BP Location: Right arm, Patient Position: Sitting, Cuff Size: adult)   Pulse 90   Ht 5' 6\" (1.676 m)   Wt 216 lb (98 kg)   BMI 34.86 kg/m²  Estimated body mass index is 34.86 kg/m² as calculated from the following:    Height as of this encounter: 5' 6\" (1.676 m).    Weight as of this encounter: 216 lb (98 kg).  Physical Exam  Vitals and nursing note reviewed.   Constitutional:       Appearance: Normal appearance. He is normal weight.   HENT:      Head: Normocephalic.      Right Ear: External ear normal.      Left Ear: External ear normal.      Nose: Nose normal.      Mouth/Throat:      Mouth: Mucous membranes are moist.   Neck:      Comments: Anterior incision with steristrips, cdi.  Cardiovascular:      Rate and Rhythm: Normal rate and regular rhythm.      Pulses: Normal pulses.      Heart sounds: Normal heart sounds. No murmur heard.  Pulmonary:      Effort: Pulmonary effort is normal. No respiratory distress.      Breath sounds: Normal breath sounds. No wheezing.   Abdominal:      General: There is no distension.      Palpations: Abdomen is soft.      Tenderness:  There is no abdominal tenderness.   Musculoskeletal:         General: Normal range of motion.      Cervical back: Normal range of motion and neck supple.      Right lower leg: No edema.      Left lower leg: No edema.      Comments: Good strength upper extrem  Numbness and tingling improving.  Pain to right bicep from never innervation. Needs PT   Lymphadenopathy:      Cervical: No cervical adenopathy.   Skin:     General: Skin is warm and dry.      Capillary Refill: Capillary refill takes less than 2 seconds.      Findings: No rash.   Neurological:      General: No focal deficit present.      Mental Status: He is alert and oriented to person, place, and time.   Psychiatric:         Mood and Affect: Mood normal.         Behavior: Behavior normal.           Assessment & Plan:   1. Tortuous aorta (HCC) (Primary)  Assessment & Plan:  Found on CXR read from 3/1/24.  CT calcium score ordered.   Await results and refer to cards and start statin as needed.  Denies CP, SOB, ALLEN.  Orders:  -     CT CALCIUM SCORING; Standing  2. Sore throat  Assessment & Plan:  Having some throat irritation s/p intubation.  Educated on cool liquids, and comfort measures.  Can Purchase OTC cepacol.  If pain persists can refer to ENT for eval   Orders:  -     Cepacol; Use as directed 1 lozenge in the mouth or throat every 4 (four) hours as needed.  Dispense: 30 lozenge; Refill: 0  3. Postop check  Assessment & Plan:  Follow up appt on 4/2/24.  NO NSAIDs.  NO lifting over 5 lbs  Start PT at surgeon discretion.  Red flags reviewed.  4. Abnormal CXR  Assessment & Plan:  CXR completed on 3/1/24: Mild cardiomegaly.  Tortuous aorta Prominent bibasilar pulmonary markings most likely chronic.  No comparison chest films.   CT calcium score ordered.  Encouraged to schedule medicare physical   5. Cervical radiculopathy  Assessment & Plan:  Underwent ACDF C3-C6 on 3/21/24 with MD Wynn on 3/21/24.     6. Numbness and tingling in both hands  Assessment &  Plan:  Improved.  7. Right arm pain  Assessment & Plan:  Likely related to nerve innervation  Needs PT at surgeon discretion  Oxy as needed for pain  No NSAIDs.          Return if symptoms worsen or fail to improve, for Annual physical.    Elias Garcia, APRN, 3/25/2024, 12:54 PM

## 2024-03-25 NOTE — TELEPHONE ENCOUNTER
Future Appointments   Date Time Provider Department Center   3/26/2024 10:20 AM Elias Garcia APRN [facility]

## 2024-03-25 NOTE — TELEPHONE ENCOUNTER
Refill passed per [facility] protocol.    Requested Prescriptions   Pending Prescriptions Disp Refills    TAMSULOSIN 0.4 MG Oral Cap [Pharmacy Med Name: TAMSULOSIN HCL 0.4 MG CAPSULE] 90 capsule 2     Sig: TAKE 1 CAPSULE BY MOUTH EVERY DAY       Genitourinary Medications Passed - 3/23/2024 12:34 AM        Passed - Patient does not have pulmonary hypertension on problem list        Passed - In person appointment or virtual visit in the past 12 mos or appointment in next 3 mos     Recent Outpatient Visits              1 month ago Preoperative examination, unspecified    [facility], [facility], Elias Bean APRN    Office Visit    1 month ago Neck sprain, initial encounter    [facility], Laureano Santiago MD    Office Visit    2 months ago Bilateral carpal tunnel syndrome    [facility] [address], Car Cordero MD    Office Visit    2 months ago Fall, initial encounter    [facility], [address], Car Cordero MD    Office Visit    3 months ago Flu-like symptoms    [facility] [place] [facility]    Office Visit          Future Appointments         Provider Department Appt Notes    Tomorrow Elias Garcia APRN [facility], [facility], [place] time                  Future Appointments         Provider Department Appt Notes    Tomorrow Elias Garcia APRN [facility], [facility]          Recent Outpatient Visits              1 month ago [facility]operative examination, unspecified    [facility]    [facility] Visit    1 month ago Neck sprain, initial encounter    [facility]    Office Visit    2 months ago Bilateral carpal tunnel syndrome    [facility],  [facility], Car Cordero MD    Office Visit    2 months ago [facility]    Office Visit    3 months ago Flu-like symptoms    [facility], [address], Elias Bean APRN    Office Visit

## 2024-03-25 NOTE — TELEPHONE ENCOUNTER
Please review.    Medication is listed as patient reported.  Requested Prescriptions   Pending Prescriptions Disp Refills    ibuprofen 600 MG Oral Tab  0     Sig: Take 1 tablet (600 mg total) by mouth every 6 (six) hours as needed for Pain.       Non-Narcotic Pain Medication Protocol Passed - 3/25/2024 12:57 PM        Passed - In person appointment or virtual visit in the past 6 mos or appointment in next 3 mos     Recent Outpatient Visits              1 month ago Preoperative examination, unspecified    [facility], Elias Bean APRN    Office Visit    1 month ago Neck sprain, initial encounter    [facility], Laureano Santiago MD    Office Visit    2 months ago Bilateral carpal tunnel syndrome    [facility] [address], Car Cordero MD    Office Visit    2 months ago Fall, initial encounter    [facility], Car Cordero MD    Office Visit    3 months ago Flu-like symptoms    [facility] [place] [facility]    Office Visit          Future Appointments         Provider Department Appt Notes    Tomorrow Elias Garcia APRN [facility], [facility], [place] time                    Future Appointments         Provider Department Appt Notes    Tomorrow Elias Garcia APRN [facility], [facility], [place] time          Recent Outpatient Visits              1 month ago Preoperative examination, unspecified    [facility], Elias Bean APRN    Office Visit    1 month ago Neck sprain, initial encounter    [facility], Laureano Santiago MD    Office Visit    2 months ago Bilateral carpal tunnel syndrome    [facility], Car Cordero MD    Office Visit    2 months  ago Fall, initial encounter    [facility], [address], Car Cordero MD    Office Visit    3 months ago Flu-like symptoms    [facility], [address], Elias Bean APRN    Office Visit

## 2024-03-26 ENCOUNTER — OFFICE VISIT (OUTPATIENT)
Dept: FAMILY MEDICINE CLINIC | Facility: CLINIC | Age: 69
End: 2024-03-26

## 2024-03-26 VITALS
BODY MASS INDEX: 34.72 KG/M2 | SYSTOLIC BLOOD PRESSURE: 124 MMHG | HEART RATE: 90 BPM | DIASTOLIC BLOOD PRESSURE: 77 MMHG | HEIGHT: 66 IN | WEIGHT: 216 LBS

## 2024-03-26 DIAGNOSIS — R93.89 ABNORMAL CXR: ICD-10-CM

## 2024-03-26 DIAGNOSIS — M79.601 RIGHT ARM PAIN: ICD-10-CM

## 2024-03-26 DIAGNOSIS — R20.2 NUMBNESS AND TINGLING IN BOTH HANDS: ICD-10-CM

## 2024-03-26 DIAGNOSIS — Z09 POSTOP CHECK: ICD-10-CM

## 2024-03-26 DIAGNOSIS — I77.1 TORTUOUS AORTA (HCC): Primary | ICD-10-CM

## 2024-03-26 DIAGNOSIS — J02.9 SORE THROAT: ICD-10-CM

## 2024-03-26 DIAGNOSIS — M54.12 CERVICAL RADICULOPATHY: ICD-10-CM

## 2024-03-26 DIAGNOSIS — R20.0 NUMBNESS AND TINGLING IN BOTH HANDS: ICD-10-CM

## 2024-03-26 PROCEDURE — 3074F SYST BP LT 130 MM HG: CPT

## 2024-03-26 PROCEDURE — 3008F BODY MASS INDEX DOCD: CPT

## 2024-03-26 PROCEDURE — 99214 OFFICE O/P EST MOD 30 MIN: CPT

## 2024-03-26 PROCEDURE — 1126F AMNT PAIN NOTED NONE PRSNT: CPT

## 2024-03-26 PROCEDURE — 3078F DIAST BP <80 MM HG: CPT

## 2024-03-26 PROCEDURE — 1159F MED LIST DOCD IN RCRD: CPT

## 2024-03-26 PROCEDURE — 1170F FXNL STATUS ASSESSED: CPT

## 2024-03-26 RX ORDER — OXYCODONE HYDROCHLORIDE 5 MG/1
TABLET ORAL
COMMUNITY
Start: 2024-03-23

## 2024-03-26 RX ORDER — BENZOCAINE AND MENTHOL, UNSPECIFIED FORM 15; 2.3 MG/1; MG/1
1 LOZENGE ORAL EVERY 4 HOURS PRN
Qty: 30 LOZENGE | Refills: 0 | Status: SHIPPED | OUTPATIENT
Start: 2024-03-26

## 2024-03-26 RX ORDER — CYCLOBENZAPRINE HCL 5 MG
TABLET ORAL
COMMUNITY
Start: 2024-03-23

## 2024-03-26 NOTE — ASSESSMENT & PLAN NOTE
Found on CXR read from 3/1/24.  CT calcium score ordered.   Await results and refer to cards and start statin as needed.  Denies CP, SOB, ALLEN.

## 2024-03-26 NOTE — ASSESSMENT & PLAN NOTE
Having some throat irritation s/p intubation.  Educated on cool liquids, and comfort measures.  Can Purchase OTC cepacol.  If pain persists can refer to ENT for eval

## 2024-03-26 NOTE — ASSESSMENT & PLAN NOTE
Likely related to nerve innervation  Needs PT at surgeon discretion  Oxy as needed for pain  No NSAIDs.

## 2024-03-26 NOTE — ASSESSMENT & PLAN NOTE
Follow up appt on 4/2/24.  NO NSAIDs.  NO lifting over 5 lbs  Start PT at surgeon discretion.  Red flags reviewed.

## 2024-03-26 NOTE — ASSESSMENT & PLAN NOTE
CXR completed on 3/1/24: Mild cardiomegaly.  Tortuous aorta Prominent bibasilar pulmonary markings most likely chronic.  No comparison chest films.   CT calcium score ordered.  Encouraged to schedule medicare physical

## 2024-03-28 NOTE — TELEPHONE ENCOUNTER
UPDATE:  Per OV with Elias TOTH, sx procedure at [facility] on 03/21/24 performed by Dr Rancho Wynn.

## 2024-06-06 ENCOUNTER — OFFICE VISIT (OUTPATIENT)
Dept: FAMILY MEDICINE CLINIC | Facility: CLINIC | Age: 69
End: 2024-06-06

## 2024-06-06 VITALS
HEART RATE: 98 BPM | SYSTOLIC BLOOD PRESSURE: 121 MMHG | HEIGHT: 66 IN | BODY MASS INDEX: 34.93 KG/M2 | DIASTOLIC BLOOD PRESSURE: 73 MMHG | WEIGHT: 217.38 LBS

## 2024-06-06 DIAGNOSIS — M54.41 ACUTE RIGHT-SIDED LOW BACK PAIN WITH RIGHT-SIDED SCIATICA: ICD-10-CM

## 2024-06-06 DIAGNOSIS — I77.1 TORTUOUS AORTA (HCC): ICD-10-CM

## 2024-06-06 DIAGNOSIS — R09.A2 GLOBUS SENSATION: ICD-10-CM

## 2024-06-06 DIAGNOSIS — R20.2 NUMBNESS AND TINGLING IN BOTH HANDS: Primary | ICD-10-CM

## 2024-06-06 DIAGNOSIS — R20.0 NUMBNESS AND TINGLING IN BOTH HANDS: Primary | ICD-10-CM

## 2024-06-06 PROBLEM — E66.01 SEVERE OBESITY (BMI 35.0-39.9) WITH COMORBIDITY (HCC): Chronic | Status: ACTIVE | Noted: 2024-06-06

## 2024-06-06 PROCEDURE — 1160F RVW MEDS BY RX/DR IN RCRD: CPT

## 2024-06-06 PROCEDURE — 3074F SYST BP LT 130 MM HG: CPT

## 2024-06-06 PROCEDURE — 1159F MED LIST DOCD IN RCRD: CPT

## 2024-06-06 PROCEDURE — 1125F AMNT PAIN NOTED PAIN PRSNT: CPT

## 2024-06-06 PROCEDURE — 3008F BODY MASS INDEX DOCD: CPT

## 2024-06-06 PROCEDURE — 3078F DIAST BP <80 MM HG: CPT

## 2024-06-06 PROCEDURE — 99213 OFFICE O/P EST LOW 20 MIN: CPT

## 2024-06-06 RX ORDER — CYCLOBENZAPRINE HCL 5 MG
5 TABLET ORAL NIGHTLY PRN
Qty: 30 TABLET | Refills: 0 | Status: SHIPPED | OUTPATIENT
Start: 2024-06-06 | End: 2024-07-06

## 2024-06-06 NOTE — ASSESSMENT & PLAN NOTE
Still having back issues.  Not cleared for PT yet.  Requesting refill of cyclobenzaprine as needed.

## 2024-06-06 NOTE — ASSESSMENT & PLAN NOTE
New sensation since surgery of food getting stuck.  No reflux symptoms.  Nothing visible on exam.  Referral to ENT for scope.

## 2024-06-06 NOTE — PROGRESS NOTES
Subjective:   Russell Ellis is a 69 year old male who presents for Arm Pain (Past 3 months, needs refill on certain meds)   Patient is a pleasant 69-year-old male with past medical history significant for BPH.  Patient presents to office today for evaluation of problem with arms. He is following up with Tianna on 24. He has not started PT yet. He is curious why after surgery he is still having numbness and tingling on first 3 digits of bilateral hands. Also still having an issue swallowing since surgery, feels food gets stuck in throat. Denies reflux, burning and cough. Ever since anterior discectomy.     Of note patient recently had anterior cervical discectomy with fusion C3-C6 procedure at [facility] on 24 performed by Dr Rancho Wynn.      Need calcium Ct score, tortuous aorta and cardiomegaly. Reinforced.   ASCVD 19.7%          History reviewed. No pertinent past medical history.   History reviewed. No pertinent surgical history.     History/Other:    Chief Complaint Reviewed and Verified  Nursing Notes Reviewed and   Verified  Tobacco Reviewed  Allergies Reviewed  Medications Reviewed    Problem List Reviewed  Medical History Reviewed  Surgical History   Reviewed  Family History Reviewed  Social History Reviewed         Tobacco:  He smoked tobacco in the past but quit greater than 12 months ago.  Social History     Tobacco Use   Smoking Status Former    Current packs/day: 0.00    Types: Cigarettes    Quit date: 2023    Years since quittin.6    Passive exposure: Never   Smokeless Tobacco Never        Current Outpatient Medications   Medication Sig Dispense Refill    cyclobenzaprine 5 MG Oral Tab Take 1 tablet (5 mg total) by mouth nightly as needed for Muscle spasms. 30 tablet 0    oxyCODONE 5 MG Oral Tab  (Patient not taking: Reported on 2024)      Benzocaine-Menthol (CEPACOL) 15-2.3 MG Mouth/Throat Lozenge Use as directed 1 lozenge in the mouth or throat  every 4 (four) hours as needed. (Patient not taking: Reported on 6/6/2024) 30 lozenge 0    tamsulosin 0.4 MG Oral Cap Take 1 capsule (0.4 mg total) by mouth daily. (Patient not taking: Reported on 6/6/2024) 90 capsule 3    methylPREDNISolone (MEDROL) 4 MG Oral Tablet Therapy Pack As directed. (Patient not taking: Reported on 6/6/2024) 21 each 0    naproxen 250 MG Oral Tab Take 1 tablet (250 mg total) by mouth 2 (two) times daily with meals. (Patient not taking: Reported on 2/19/2024) 180 tablet 0         Review of Systems:  Review of Systems   Constitutional: Negative.  Negative for activity change, chills and fever.   HENT: Negative.  Negative for congestion, ear pain, postnasal drip, sinus pain, sore throat and trouble swallowing.    Respiratory: Negative.  Negative for cough, shortness of breath and wheezing.    Cardiovascular: Negative.  Negative for chest pain and leg swelling.   Gastrointestinal: Negative.  Negative for abdominal pain, blood in stool, constipation and diarrhea.   Endocrine: Negative.    Genitourinary: Negative.  Negative for difficulty urinating, dysuria and flank pain.   Musculoskeletal: Negative.  Negative for arthralgias, back pain and neck stiffness.   Skin: Negative.  Negative for color change and rash.   Neurological:  Positive for weakness. Negative for dizziness and headaches.   Hematological:  Negative for adenopathy.         Objective:   /73 (BP Location: Right arm, Patient Position: Sitting, Cuff Size: large)   Pulse 98   Ht 5' 6\" (1.676 m)   Wt 217 lb 6.4 oz (98.6 kg)   BMI 35.09 kg/m²  Estimated body mass index is 35.09 kg/m² as calculated from the following:    Height as of this encounter: 5' 6\" (1.676 m).    Weight as of this encounter: 217 lb 6.4 oz (98.6 kg).  Physical Exam  Vitals and nursing note reviewed.   Constitutional:       Appearance: Normal appearance. He is normal weight.   HENT:      Head: Normocephalic.      Right Ear: Tympanic membrane, ear canal and  external ear normal.      Left Ear: Tympanic membrane, ear canal and external ear normal.      Nose: Rhinorrhea present. No congestion.      Mouth/Throat:      Mouth: Mucous membranes are moist.      Pharynx: No oropharyngeal exudate or posterior oropharyngeal erythema.   Eyes:      Extraocular Movements: Extraocular movements intact.      Pupils: Pupils are equal, round, and reactive to light.   Cardiovascular:      Rate and Rhythm: Normal rate and regular rhythm.      Pulses: Normal pulses.      Heart sounds: Normal heart sounds. No murmur heard.  Pulmonary:      Effort: Pulmonary effort is normal. No respiratory distress.      Breath sounds: Normal breath sounds. No wheezing.   Abdominal:      General: There is no distension.      Palpations: Abdomen is soft.      Tenderness: There is no abdominal tenderness.   Musculoskeletal:      Cervical back: Normal range of motion and neck supple.      Right lower leg: No edema.      Left lower leg: No edema.      Comments: Weaker right grasp  Numbness and tingling bilateral first 3 fingers.    Lymphadenopathy:      Cervical: No cervical adenopathy.   Skin:     General: Skin is warm and dry.      Capillary Refill: Capillary refill takes less than 2 seconds.      Findings: No rash.      Comments: Scar anterior neck   Neurological:      General: No focal deficit present.      Mental Status: He is alert and oriented to person, place, and time.   Psychiatric:         Mood and Affect: Mood normal.         Behavior: Behavior normal.           Assessment & Plan:   1. Numbness and tingling in both hands (Primary)  Assessment & Plan:  Underwent ACDF C3-C6 on 3/21/24 with MD Wynn on 3/21/24   Has not started PT yet.  Still with N/T and weakness  Follow up with surgeon 6/12  Better but still remains.  Orders:  -     Cyclobenzaprine HCl; Take 1 tablet (5 mg total) by mouth nightly as needed for Muscle spasms.  Dispense: 30 tablet; Refill: 0  2. Tortuous aorta (HCC)  Assessment &  Plan:  Encouraged patient again in office to obtain Calcium ct score.  Needs repeat lipids test as well.  Encourgaed to schedule physical  3. Acute right-sided low back pain with right-sided sciatica  Assessment & Plan:  Still having back issues.  Not cleared for PT yet.  Requesting refill of cyclobenzaprine as needed.  Orders:  -     Cyclobenzaprine HCl; Take 1 tablet (5 mg total) by mouth nightly as needed for Muscle spasms.  Dispense: 30 tablet; Refill: 0  4. Globus sensation  Assessment & Plan:  New sensation since surgery of food getting stuck.  No reflux symptoms.  Nothing visible on exam.  Referral to ENT for scope.  Orders:  -     ENT Referral - In Network        Return for Annual physical.    NAVNEET Palumbo, 6/5/2024, 8:29 PM

## 2024-06-06 NOTE — ASSESSMENT & PLAN NOTE
Underwent ACDF C3-C6 on 3/21/24 with MD Wynn on 3/21/24   Has not started PT yet.  Still with N/T and weakness  Follow up with surgeon 6/12

## 2024-06-06 NOTE — ASSESSMENT & PLAN NOTE
Encouraged patient again in office to obtain Calcium ct score.  Needs repeat lipids test as well.  Encourgaed to schedule physical

## 2024-06-06 NOTE — ASSESSMENT & PLAN NOTE
Underwent ACDF C3-C6 on 3/21/24 with MD Wynn on 3/21/24   Has not started PT yet.  Still with N/T and weakness  Follow up with surgeon 6/12  Better but still remains.

## 2024-06-25 NOTE — PROGRESS NOTES
Russell Ellis is a 69 year old male who presents for a pre-operative physical exam.   Russell Ellis is scheduled for a L3-S1 decompressive laminectomy procedure at [facility] on 7/15/24 performed by Dr Rancho Rodríguez. Indication: Paresthesia and weakness in lower extremities.    HPI related to surgery:   Patient previously had a anterior cervical discectomy with fusion C3-C6 procedure [facility] on 3/21/2024 for cervical spondylitic myelopathy with myeloomalacia. Now having issues in lower back with weakness. Primary focus was cervical and fixed, now next step.     He  has had previous anesthesia:  Yes.  Previous complications:  Yes    Social History     Socioeconomic History    Marital status:    Tobacco Use    Smoking status: Former     Current packs/day: 0.00     Types: Cigarettes     Quit date: 2023     Years since quittin.7     Passive exposure: Never    Smokeless tobacco: Never   Vaping Use    Vaping status: Never Used   Substance and Sexual Activity    Alcohol use: Never    Drug use: Never      Past Medical History:    Fall    Right arm pain    Sore throat     History reviewed. No pertinent surgical history.  Allergies: No Known Allergies  Current Outpatient Medications   Medication Sig Dispense Refill    cyclobenzaprine 5 MG Oral Tab Take 1 tablet (5 mg total) by mouth nightly as needed for Muscle spasms. (Patient not taking: Reported on 2024) 30 tablet 0    oxyCODONE 5 MG Oral Tab  (Patient not taking: Reported on 2024)      Benzocaine-Menthol (CEPACOL) 15-2.3 MG Mouth/Throat Lozenge Use as directed 1 lozenge in the mouth or throat every 4 (four) hours as needed. (Patient not taking: Reported on 2024) 30 lozenge 0    tamsulosin 0.4 MG Oral Cap Take 1 capsule (0.4 mg total) by mouth daily. (Patient not taking: Reported on 2024) 90 capsule 3    methylPREDNISolone (MEDROL) 4 MG Oral Tablet Therapy Pack As directed. (Patient not taking: Reported on 2024)  21 each 0    naproxen 250 MG Oral Tab Take 1 tablet (250 mg total) by mouth 2 (two) times daily with meals. (Patient not taking: Reported on 2/19/2024) 180 tablet 0        REVIEW OF SYSTEMS:   Review of Systems   Constitutional:  Negative for chills and fever.   HENT:  Negative for congestion and sore throat.    Respiratory:  Negative for cough and shortness of breath.    Cardiovascular:  Negative for chest pain and palpitations.   Gastrointestinal:  Negative for constipation, diarrhea, nausea and vomiting.   Musculoskeletal:  Positive for back pain.   Skin:  Negative for rash.   Neurological:  Positive for weakness. Negative for dizziness and tingling.   All other systems reviewed and are negative.        PHYSICAL EXAM:   /76 (BP Location: Right arm, Patient Position: Sitting, Cuff Size: large)   Pulse 98   Ht 5' 6\" (1.676 m)   Wt 216 lb 3.2 oz (98.1 kg)   BMI 34.90 kg/m²    Physical Exam  Vitals and nursing note reviewed.   Constitutional:       Appearance: Normal appearance. He is normal weight.   HENT:      Head: Normocephalic.      Right Ear: Tympanic membrane, ear canal and external ear normal.      Left Ear: Tympanic membrane, ear canal and external ear normal.      Ears:      Comments: Scarred TM right     Nose: Nose normal. No congestion or rhinorrhea.      Mouth/Throat:      Mouth: Mucous membranes are moist.      Pharynx: No oropharyngeal exudate or posterior oropharyngeal erythema.   Eyes:      Extraocular Movements: Extraocular movements intact.      Pupils: Pupils are equal, round, and reactive to light.   Cardiovascular:      Rate and Rhythm: Normal rate and regular rhythm.      Pulses: Normal pulses.      Heart sounds: Normal heart sounds. No murmur heard.  Pulmonary:      Effort: Pulmonary effort is normal. No respiratory distress.      Breath sounds: Normal breath sounds. No wheezing.   Abdominal:      General: There is no distension.      Palpations: Abdomen is soft.      Tenderness:  There is no abdominal tenderness.   Musculoskeletal:         General: Tenderness present. No swelling.      Cervical back: Normal range of motion and neck supple.      Right lower leg: No edema.      Left lower leg: No edema.      Comments: Positive straight leg raise.   Lymphadenopathy:      Cervical: No cervical adenopathy.   Skin:     General: Skin is warm and dry.      Capillary Refill: Capillary refill takes less than 2 seconds.      Findings: No rash.      Comments: Anterior neck scar well healed.    Neurological:      General: No focal deficit present.      Mental Status: He is alert and oriented to person, place, and time.   Psychiatric:         Mood and Affect: Mood normal.         Behavior: Behavior normal.          LABORATORY DATA:   Ordered CBC, CMP, PT, PTT, UA, CXR, EKG  See Attached    ASSESSMENT AND PLAN:   Russell Ellis has no significant history of cardiac or pulmonary conditions. He has new tortuous aorta on CXR that is being worked up with Calcium CT. He had this prior to previous procedure and still needs to schedule.    He is a good surgical candidate and is medically cleared for surgery by myself Elias TOTH and my collaborating physician Megha Gusman MD.  This consult was sent back the referring physician, Dr. Rancho Wynn    Assessment:    1. Preoperative examination, unspecified  Scheduled for L3-S1 decompressive laminectomy on 7/15/2024 with Dr. Rancho Rodríguez at [facility] in [facility].  CMP, CBC with differential, PT, PTT, UA, EKG, chest x-ray ordered  Type and screen must be completed location of surgery hospital will call patient with instructions  No preference for choice of attending MD, consult surgeons choice of hospitalist.  Cleared pending labs and workup.  Instructed to follow-up with physical and to obtain CT calcium score.  - Comp Metabolic Panel (14) [E]; Future  - CBC With Differential With Platelet; Future  - Prothrombin Time (PT)  [E]; Future  - PTT, Activated [E]; Future  - Urinalysis with Culture Reflex; Future  - EKG 12 Lead; Future  - XR CHEST PA + LAT CHEST (CPT=71046); Future    2. Tortuous aorta (HCC)  Found on chest x-ray 3/1/2024 before anterior cervical discectomy.  CT calcium score ordered  Patient encouraged to schedule again, also encouraged to schedule physical again to assess cholesterol.    3. Spinal stenosis of lumbar region, unspecified whether neurogenic claudication present  Scheduled for L3-S1 decompressive laminectomy at [facility] on 7/15/2024 with Dr. Rancho Rodríguez.  Reports weakness in lower extremities and legs giving out  Positive straight leg raise    4. Cervical radiculopathy  Improving status post anterior cervical discectomy with fusion C3-C6 on 3/21/2024  Same surgeon  Still with some weakness to right side improving  Still having some baseline numbness and tingling improving       5. Globus sensation  Resolving status post anterior cervical discectomy  Has referral to ENT if needed          NAVNEET Palumbo

## 2024-06-27 ENCOUNTER — MED REC SCAN ONLY (OUTPATIENT)
Dept: FAMILY MEDICINE CLINIC | Facility: CLINIC | Age: 69
End: 2024-06-27

## 2024-06-27 ENCOUNTER — OFFICE VISIT (OUTPATIENT)
Dept: FAMILY MEDICINE CLINIC | Facility: CLINIC | Age: 69
End: 2024-06-27

## 2024-06-27 ENCOUNTER — EKG ENCOUNTER (OUTPATIENT)
Dept: LAB | Age: 69
End: 2024-06-27

## 2024-06-27 ENCOUNTER — LAB ENCOUNTER (OUTPATIENT)
Dept: LAB | Age: 69
End: 2024-06-27

## 2024-06-27 ENCOUNTER — HOSPITAL ENCOUNTER (OUTPATIENT)
Dept: GENERAL RADIOLOGY | Age: 69
Discharge: HOME | End: 2024-06-27

## 2024-06-27 VITALS
DIASTOLIC BLOOD PRESSURE: 76 MMHG | HEART RATE: 98 BPM | WEIGHT: 216.19 LBS | HEIGHT: 66 IN | BODY MASS INDEX: 34.74 KG/M2 | SYSTOLIC BLOOD PRESSURE: 115 MMHG

## 2024-06-27 DIAGNOSIS — Z01.818 PREOPERATIVE EXAMINATION, UNSPECIFIED: ICD-10-CM

## 2024-06-27 DIAGNOSIS — M54.12 CERVICAL RADICULOPATHY: ICD-10-CM

## 2024-06-27 DIAGNOSIS — R09.A2 GLOBUS SENSATION: ICD-10-CM

## 2024-06-27 DIAGNOSIS — I77.1 TORTUOUS AORTA (HCC): ICD-10-CM

## 2024-06-27 DIAGNOSIS — Z01.818 PREOPERATIVE EXAMINATION, UNSPECIFIED: Primary | ICD-10-CM

## 2024-06-27 DIAGNOSIS — M48.061 SPINAL STENOSIS OF LUMBAR REGION, UNSPECIFIED WHETHER NEUROGENIC CLAUDICATION PRESENT: ICD-10-CM

## 2024-06-27 PROBLEM — J02.9 SORE THROAT: Status: RESOLVED | Noted: 2024-03-26 | Resolved: 2024-06-27

## 2024-06-27 PROBLEM — Z09 POSTOP CHECK: Status: RESOLVED | Noted: 2024-03-26 | Resolved: 2024-06-27

## 2024-06-27 PROBLEM — R68.89 FLU-LIKE SYMPTOMS: Status: RESOLVED | Noted: 2023-12-21 | Resolved: 2024-06-27
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
## 2024-06-27 PROBLEM — M79.601 RIGHT ARM PAIN: Status: RESOLVED | Noted: 2024-03-26 | Resolved: 2024-06-27

## 2024-06-27 LAB
ALBUMIN SERPL-MCNC: 4.8 G/DL (ref 3.2–4.8)
ALBUMIN/GLOB SERPL: 1.8 {RATIO} (ref 1–2)
ALP LIVER SERPL-CCNC: 88 U/L
ALT SERPL-CCNC: 26 U/L
ANION GAP SERPL CALC-SCNC: 8 MMOL/L (ref 0–18)
APTT PPP: 33.7 SECONDS (ref 23–36)
AST SERPL-CCNC: 22 U/L (ref ?–34)
ATRIAL RATE: 86 BPM
BASOPHILS # BLD AUTO: 0.13 X10(3) UL (ref 0–0.2)
BASOPHILS NFR BLD AUTO: 2 %
BILIRUB SERPL-MCNC: 0.7 MG/DL (ref 0.2–1.1)
BUN BLD-MCNC: 10 MG/DL (ref 9–23)
BUN/CREAT SERPL: 10 (ref 10–20)
CALCIUM BLD-MCNC: 9.5 MG/DL (ref 8.7–10.4)
CHLORIDE SERPL-SCNC: 108 MMOL/L (ref 98–112)
CO2 SERPL-SCNC: 26 MMOL/L (ref 21–32)
CREAT BLD-MCNC: 1 MG/DL
DEPRECATED RDW RBC AUTO: 45.1 FL (ref 35.1–46.3)
EGFRCR SERPLBLD CKD-EPI 2021: 82 ML/MIN/1.73M2 (ref 60–?)
EOSINOPHIL # BLD AUTO: 0.16 X10(3) UL (ref 0–0.7)
EOSINOPHIL NFR BLD AUTO: 2.4 %
ERYTHROCYTE [DISTWIDTH] IN BLOOD BY AUTOMATED COUNT: 12.7 % (ref 11–15)
FASTING STATUS PATIENT QL REPORTED: YES
GLOBULIN PLAS-MCNC: 2.6 G/DL (ref 2–3.5)
GLUCOSE BLD-MCNC: 91 MG/DL (ref 70–99)
HCT VFR BLD AUTO: 46.5 %
HGB BLD-MCNC: 16.4 G/DL
IMM GRANULOCYTES # BLD AUTO: 0.02 X10(3) UL (ref 0–1)
IMM GRANULOCYTES NFR BLD: 0.3 %
INR BLD: 0.99 (ref 0.8–1.2)
LYMPHOCYTES # BLD AUTO: 2.55 X10(3) UL (ref 1–4)
LYMPHOCYTES NFR BLD AUTO: 38.6 %
MCH RBC QN AUTO: 33.5 PG (ref 26–34)
MCHC RBC AUTO-ENTMCNC: 35.3 G/DL (ref 31–37)
MCV RBC AUTO: 95.1 FL
MONOCYTES # BLD AUTO: 0.67 X10(3) UL (ref 0.1–1)
MONOCYTES NFR BLD AUTO: 10.2 %
NEUTROPHILS # BLD AUTO: 3.07 X10 (3) UL (ref 1.5–7.7)
NEUTROPHILS # BLD AUTO: 3.07 X10(3) UL (ref 1.5–7.7)
NEUTROPHILS NFR BLD AUTO: 46.5 %
OSMOLALITY SERPL CALC.SUM OF ELEC: 293 MOSM/KG (ref 275–295)
P AXIS: 46 DEGREES
P-R INTERVAL: 158 MS
PLATELET # BLD AUTO: 257 10(3)UL (ref 150–450)
POTASSIUM SERPL-SCNC: 4.8 MMOL/L (ref 3.5–5.1)
PROT SERPL-MCNC: 7.4 G/DL (ref 5.7–8.2)
PROTHROMBIN TIME: 13.7 SECONDS (ref 11.6–14.8)
Q-T INTERVAL: 402 MS
QRS DURATION: 128 MS
QTC CALCULATION (BEZET): 481 MS
R AXIS: -23 DEGREES
RBC # BLD AUTO: 4.89 X10(6)UL
SODIUM SERPL-SCNC: 142 MMOL/L (ref 136–145)
T AXIS: 103 DEGREES
VENTRICULAR RATE: 86 BPM
WBC # BLD AUTO: 6.6 X10(3) UL (ref 4–11)

## 2024-06-27 PROCEDURE — 36415 COLL VENOUS BLD VENIPUNCTURE: CPT

## 2024-06-27 PROCEDURE — 85730 THROMBOPLASTIN TIME PARTIAL: CPT

## 2024-06-27 PROCEDURE — 1160F RVW MEDS BY RX/DR IN RCRD: CPT

## 2024-06-27 PROCEDURE — 3074F SYST BP LT 130 MM HG: CPT

## 2024-06-27 PROCEDURE — 85610 PROTHROMBIN TIME: CPT

## 2024-06-27 PROCEDURE — 71046 X-RAY EXAM CHEST 2 VIEWS: CPT

## 2024-06-27 PROCEDURE — 93005 ELECTROCARDIOGRAM TRACING: CPT

## 2024-06-27 PROCEDURE — 99214 OFFICE O/P EST MOD 30 MIN: CPT

## 2024-06-27 PROCEDURE — 3078F DIAST BP <80 MM HG: CPT

## 2024-06-27 PROCEDURE — 1159F MED LIST DOCD IN RCRD: CPT

## 2024-06-27 PROCEDURE — 3008F BODY MASS INDEX DOCD: CPT

## 2024-06-27 PROCEDURE — 85025 COMPLETE CBC W/AUTO DIFF WBC: CPT

## 2024-06-27 PROCEDURE — 1126F AMNT PAIN NOTED NONE PRSNT: CPT

## 2024-06-27 PROCEDURE — 80053 COMPREHEN METABOLIC PANEL: CPT

## 2024-06-27 PROCEDURE — 93010 ELECTROCARDIOGRAM REPORT: CPT | Performed by: INTERNAL MEDICINE

## 2024-06-27 NOTE — ASSESSMENT & PLAN NOTE
Improving status post anterior cervical discectomy with fusion C3-C6 on 3/21/2024  Same surgeon  Still with some weakness to right side improving  Still having some baseline numbness and tingling improving

## 2024-06-27 NOTE — ASSESSMENT & PLAN NOTE
Scheduled for L3-S1 decompressive laminectomy on 7/15/2024 with Dr. Rancho Rodríguez at [facility] in [facility].  CMP, CBC with differential, PT, PTT, UA, EKG, chest x-ray ordered  Type and screen must be completed location of surgery hospital will call patient with instructions  No preference for choice of attending MD, consult surgeons choice of hospitalist.  Cleared pending labs and workup.  Instructed to follow-up with physical and to obtain CT calcium score.

## 2024-06-27 NOTE — ASSESSMENT & PLAN NOTE
Found on chest x-ray 3/1/2024 before anterior cervical discectomy.  CT calcium score ordered  Patient encouraged to schedule again, also encouraged to schedule physical again to assess cholesterol.

## 2024-06-27 NOTE — ASSESSMENT & PLAN NOTE
Scheduled for L3-S1 decompressive laminectomy at [facility] on 7/15/2024 with Dr. Rancho Rodríguez.  Reports weakness in lower extremities and legs giving out  Positive straight leg raise

## 2024-06-28 ENCOUNTER — TELEPHONE (OUTPATIENT)
Dept: FAMILY MEDICINE CLINIC | Facility: CLINIC | Age: 69
End: 2024-06-28

## 2024-06-28 NOTE — TELEPHONE ENCOUNTER
Patient viewed [employer] message.    From  April LENNY Reddy To  Russell Ellis Sent and Delivered  6/28/2024  1:55 PM   Last Read in [employer]  6/28/2024  2:01 PM by Russell Ellis

## 2024-06-28 NOTE — TELEPHONE ENCOUNTER
Verified name and .    Patient with upcoming lower back surgery with Dr. Tianna Vickers on 7/15/24.    Patient was seen during pre-op visit with NAVNEET Griffin on 24.    He states he attempted to schedule CT calcium score test that was ordered by NAVNEET Griffin, however, that the first available is not until 24.  He is asking if this test needs to be completed prior to his surgery on 7/15/24, and if so, if sooner scheduling can be facilitated.    Please advise and thank you.

## 2024-07-02 ENCOUNTER — TELEPHONE (OUTPATIENT)
Dept: FAMILY MEDICINE CLINIC | Facility: CLINIC | Age: 69
End: 2024-07-02

## 2024-07-02 NOTE — TELEPHONE ENCOUNTER
Called pt to inform him that he needs to get Urinalysis with Culture Reflex done in order to be cleared for surgery per Elias Garcia request.      Llamé al pt para informarle que necesita hacerse un análisis de orina con Culture Reflex para poder recibir autorización para la cirugía según la solicitud de Elias Garcia.

## 2024-07-05 ENCOUNTER — LAB ENCOUNTER (OUTPATIENT)
Dept: LAB | Age: 69
End: 2024-07-05
Payer: MEDICARE

## 2024-07-05 DIAGNOSIS — Z01.818 PREOPERATIVE EXAMINATION, UNSPECIFIED: ICD-10-CM

## 2024-07-05 LAB
BILIRUB UR QL: NEGATIVE
CLARITY UR: CLEAR
COLOR UR: YELLOW
GLUCOSE UR-MCNC: NORMAL MG/DL
HGB UR QL STRIP.AUTO: NEGATIVE
KETONES UR-MCNC: NEGATIVE MG/DL
LEUKOCYTE ESTERASE UR QL STRIP.AUTO: NEGATIVE
NITRITE UR QL STRIP.AUTO: NEGATIVE
PH UR: 6 [PH] (ref 5–8)
PROT UR-MCNC: NEGATIVE MG/DL
SP GR UR STRIP: 1.02 (ref 1–1.03)
UROBILINOGEN UR STRIP-ACNC: NORMAL

## 2024-07-05 PROCEDURE — 81003 URINALYSIS AUTO W/O SCOPE: CPT

## 2024-07-24 ENCOUNTER — TELEPHONE (OUTPATIENT)
Dept: FAMILY MEDICINE CLINIC | Facility: CLINIC | Age: 69
End: 2024-07-24

## 2024-09-05 ENCOUNTER — HOSPITAL ENCOUNTER (OUTPATIENT)
Dept: CT IMAGING | Age: 69
Discharge: HOME | End: 2024-09-05

## 2024-09-05 DIAGNOSIS — Z13.6 SCREENING FOR HEART DISEASE: ICD-10-CM

## 2024-09-05 NOTE — PROGRESS NOTES
Date of Service 9/5/2024    GIANLUCA RODRIGUEZ  Date of Birth 3/22/1955    Patient Age: 69 year old    PCP: Car Fajardo MD  [address] [place] [address] [address]  [facility] [id number]    Heart Scan Consult  Preliminary Heart Scan Score: 0    Previous Screening  Heart Scan Completed Previously: No                   Risk Factors  Personal Risk Factors  Alterable Risk Factors: Abnormal Cholesterol;Pre-Diabetes;Lack of exercise      Body Mass Index  BMI 34    Blood Pressure  /76 no med.  (Normal =< 120/80,  Elevated = 120-129/ >80,  High Stage1 130-139/80-89 , Stage2 >140/>90)    Lipid Profile  Cholesterol: 183, done on 6/8/2023.  HDL Cholesterol: 32, done on 6/8/2023.  LDL Cholesterol: 130, done on 6/8/2023.  TriGlycerides 115, done on 6/8/2023.  He has appt with his PCP tomorrow, 9/6/24. He is not on med.    Cholesterol Goals  Value   Total  =< 200   HDL  = > 45 Men = > 55 Women   LDL   =< 100   Triglycerides  =< 150       Glucose and Hemoglobin A1C  Lab Results   Component Value Date    GLU 91 06/27/2024    A1C 5.8 (H) 06/08/2023     (Normal Fasting Glucose < 100mg/dl )    Nurse Review  Risk factor information and results reviewed with Nurse: Yes    Recommended Follow Up:  Consult your physician regarding:: Final Heart Scan Report;Discuss potential for Incidental Finding      Recommendations for Change:  Nutrition Changes: Low Saturated Fat;Increase Fiber    Cholesterol Modification (goal of therapy depends upon your risk): Increase HDL (Healthy/Good) Normal >45 Men >55 Women;Decrease LDL (Lousy/Bad) Ideal <100    Exercise: Enhance Current Program (Based on what your doctors are telling you to do related to your recent back surgery.)         Weight Management: Decrease Current Weight    Stress Management: Adopt Stress Management Techniques    Repeat Heart Scan: 5 years if Calcium Score is 0.0;Discuss with your Physician              [facility] Recommended Resources:  Recommended Resources: Upcoming Classes, Medical  Services and Health Library [email or web address]            Yolanda BENDER RN        Please Contact the Nurse Heart Line with any Questions or Concerns [phone].

## 2024-09-06 ENCOUNTER — OFFICE VISIT (OUTPATIENT)
Dept: FAMILY MEDICINE CLINIC | Facility: CLINIC | Age: 69
End: 2024-09-06
Payer: MEDICARE

## 2024-09-06 VITALS
HEIGHT: 66 IN | DIASTOLIC BLOOD PRESSURE: 71 MMHG | WEIGHT: 218 LBS | OXYGEN SATURATION: 96 % | HEART RATE: 88 BPM | SYSTOLIC BLOOD PRESSURE: 106 MMHG | BODY MASS INDEX: 35.03 KG/M2

## 2024-09-06 DIAGNOSIS — Z09 SURGICAL FOLLOWUP VISIT: Primary | ICD-10-CM

## 2024-09-06 DIAGNOSIS — R93.1 ABNORMAL HEART SCORE CT: Primary | ICD-10-CM

## 2024-09-06 DIAGNOSIS — M48.061 SPINAL STENOSIS OF LUMBAR REGION, UNSPECIFIED WHETHER NEUROGENIC CLAUDICATION PRESENT: ICD-10-CM

## 2024-09-06 DIAGNOSIS — H61.22 LEFT EAR IMPACTED CERUMEN: ICD-10-CM

## 2024-09-06 PROCEDURE — 1159F MED LIST DOCD IN RCRD: CPT

## 2024-09-06 PROCEDURE — 3008F BODY MASS INDEX DOCD: CPT

## 2024-09-06 PROCEDURE — 3078F DIAST BP <80 MM HG: CPT

## 2024-09-06 PROCEDURE — 1160F RVW MEDS BY RX/DR IN RCRD: CPT

## 2024-09-06 PROCEDURE — 1126F AMNT PAIN NOTED NONE PRSNT: CPT

## 2024-09-06 PROCEDURE — 3074F SYST BP LT 130 MM HG: CPT

## 2024-09-06 PROCEDURE — 99213 OFFICE O/P EST LOW 20 MIN: CPT

## 2024-09-06 NOTE — PROGRESS NOTES
Subjective:   Russell Ellis is a 69 year old male who presents for Surgical Followup (Feel fine, right arm pain )   Patient is a pleasant 69-year-old male with past medical history significant for BPH, cervical radiculopathy, spinal stenosis of lumbar spine, tortuous aorta, and obesity. Patient previously had a anterior cervical discectomy with fusion C3-C6 procedure [facility] on 3/21/2024 for cervical spondylitic myelopathy with myeloomalacia. Then he proceeded with L3-S1 decompressive laminectomy procedure at [facility] on 7/15/24 performed by Dr Rancho Rodríguez. Patient follows up in office today for reported surgical followup. Patient states lower legs are good, strength is better, he started therapy but didn't think it was beneficial. He told surgeon and he is aware and encouraged walking. Strength is coming back. NO issues. Denies Numbness and tingling and bowel issues. Is walking and dancing more. Still having issues with numbness and tingling in hands. He is following up in 6 months with neurosurgery. Compelted calcium CT and score 0. Would like to see ENT to have left ear cleaned. Referral provided.         Past Medical History:    Fall    Right arm pain    Sore throat      History reviewed. No pertinent surgical history.     History/Other:    Chief Complaint Reviewed and Verified  Nursing Notes Reviewed and   Verified  Tobacco Reviewed  Allergies Reviewed  Medications Reviewed    Problem List Reviewed  Medical History Reviewed  Surgical History   Reviewed  Family History Reviewed  Social History Reviewed         Tobacco:  He smoked tobacco in the past but quit greater than 12 months ago.  Social History     Tobacco Use   Smoking Status Former    Current packs/day: 0.00    Types: Cigarettes    Quit date: 2023    Years since quittin.9    Passive exposure: Never   Smokeless Tobacco Never        Current Outpatient Medications   Medication Sig Dispense Refill    tamsulosin  0.4 MG Oral Cap Take 1 capsule (0.4 mg total) by mouth daily. 90 capsule 3    oxyCODONE 5 MG Oral Tab  (Patient not taking: Reported on 6/6/2024)      Benzocaine-Menthol (CEPACOL) 15-2.3 MG Mouth/Throat Lozenge Use as directed 1 lozenge in the mouth or throat every 4 (four) hours as needed. (Patient not taking: Reported on 6/6/2024) 30 lozenge 0    methylPREDNISolone (MEDROL) 4 MG Oral Tablet Therapy Pack As directed. (Patient not taking: Reported on 6/6/2024) 21 each 0    naproxen 250 MG Oral Tab Take 1 tablet (250 mg total) by mouth 2 (two) times daily with meals. (Patient not taking: Reported on 2/19/2024) 180 tablet 0         Review of Systems:  Review of Systems   Constitutional: Negative.  Negative for activity change, chills and fever.   HENT:  Positive for hearing loss. Negative for congestion, ear pain, postnasal drip, sinus pain, sore throat and trouble swallowing.    Respiratory: Negative.  Negative for cough, shortness of breath and wheezing.    Cardiovascular: Negative.  Negative for chest pain and leg swelling.   Gastrointestinal: Negative.  Negative for abdominal pain, blood in stool, constipation and diarrhea.   Endocrine: Negative.    Genitourinary: Negative.  Negative for difficulty urinating, dysuria and flank pain.   Musculoskeletal: Negative.  Negative for arthralgias, back pain and neck stiffness.   Skin: Negative.  Negative for color change and rash.   Neurological:  Positive for numbness. Negative for dizziness and headaches.   Hematological:  Negative for adenopathy.         Objective:   /71 (BP Location: Left arm, Patient Position: Sitting, Cuff Size: large)   Pulse 88   Ht 5' 6\" (1.676 m)   Wt 218 lb (98.9 kg)   SpO2 96%   BMI 35.19 kg/m²  Estimated body mass index is 35.19 kg/m² as calculated from the following:    Height as of this encounter: 5' 6\" (1.676 m).    Weight as of this encounter: 218 lb (98.9 kg).  Physical Exam  Vitals and nursing note reviewed.   Constitutional:        Appearance: Normal appearance. He is normal weight.   HENT:      Head: Normocephalic and atraumatic.      Right Ear: Ear canal and external ear normal.      Left Ear: Ear canal and external ear normal. There is impacted cerumen.      Nose: Nose normal. No congestion or rhinorrhea.   Cardiovascular:      Rate and Rhythm: Normal rate and regular rhythm.      Pulses: Normal pulses.      Heart sounds: Normal heart sounds.   Pulmonary:      Effort: Pulmonary effort is normal.      Breath sounds: Normal breath sounds.   Musculoskeletal:         General: No swelling or tenderness. Normal range of motion.   Skin:     General: Skin is warm and dry.      Capillary Refill: Capillary refill takes less than 2 seconds.      Comments: 6 inch scar lower back  Healed  No drainage   Neurological:      Mental Status: He is alert and oriented to person, place, and time.           Assessment & Plan:   1. Surgical followup visit (Primary)  2. Spinal stenosis of lumbar region, unspecified whether neurogenic claudication present  3. Left ear impacted cerumen  -     ENT Referral - In Network    No issues s/p surgery  Had follow up visit with surgeon  Will follow up again 6 months  Left PT, surgeon aware  Staying active    Left ear with impacted cerumen  Requesting referral to ENT for removal  Referral placed    Patient aware of plan of care. All questions answered to satisfaction of the patient. Patient instructed to call office or reach out via [employer] if any issues arise. For urgent issues and/or reviewed red flags please proceed to the urgent care or ER.  Also, inform the nurse practitioner with any new symptoms or medication side effects.        Return for Annual physical.    NAVNEET Palumbo, 9/5/2024, 9:09 PM

## 2025-01-16 ENCOUNTER — OFFICE VISIT (OUTPATIENT)
Dept: FAMILY MEDICINE CLINIC | Facility: CLINIC | Age: 70
End: 2025-01-16

## 2025-01-16 ENCOUNTER — NURSE TRIAGE (OUTPATIENT)
Dept: FAMILY MEDICINE CLINIC | Facility: CLINIC | Age: 70
End: 2025-01-16

## 2025-01-16 VITALS
DIASTOLIC BLOOD PRESSURE: 63 MMHG | HEIGHT: 66 IN | OXYGEN SATURATION: 96 % | HEART RATE: 94 BPM | BODY MASS INDEX: 34.98 KG/M2 | WEIGHT: 217.63 LBS | SYSTOLIC BLOOD PRESSURE: 105 MMHG

## 2025-01-16 DIAGNOSIS — H71.92 CHOLESTEATOMA OF EAR, LEFT: Primary | ICD-10-CM

## 2025-01-16 DIAGNOSIS — H81.10 BENIGN PAROXYSMAL POSITIONAL VERTIGO, UNSPECIFIED LATERALITY: ICD-10-CM

## 2025-01-16 DIAGNOSIS — L29.9 EAR ITCHING: ICD-10-CM

## 2025-01-16 PROCEDURE — 1126F AMNT PAIN NOTED NONE PRSNT: CPT

## 2025-01-16 PROCEDURE — 3078F DIAST BP <80 MM HG: CPT

## 2025-01-16 PROCEDURE — 99213 OFFICE O/P EST LOW 20 MIN: CPT

## 2025-01-16 PROCEDURE — 1159F MED LIST DOCD IN RCRD: CPT

## 2025-01-16 PROCEDURE — 3008F BODY MASS INDEX DOCD: CPT

## 2025-01-16 PROCEDURE — 3074F SYST BP LT 130 MM HG: CPT

## 2025-01-16 PROCEDURE — 1160F RVW MEDS BY RX/DR IN RCRD: CPT

## 2025-01-16 RX ORDER — MECLIZINE HCL 12.5 MG 12.5 MG/1
12.5 TABLET ORAL 3 TIMES DAILY PRN
Qty: 30 TABLET | Refills: 0 | Status: SHIPPED | OUTPATIENT
Start: 2025-01-16

## 2025-01-16 NOTE — TELEPHONE ENCOUNTER
Action Requested: Summary for Provider     []  Critical Lab, Recommendations Needed  [] Need Additional Advice  []   FYI    []   Need Orders  [] Need Medications Sent to Pharmacy  []  Other     SUMMARY: Appointment scheduled with NAVNEET Garcia today at 1:00 PM, per protocol.     Reason for call: Earache  Onset: last night    Spoke to patient, full name and date of birth verified.  Patient reports itchiness in left ear began last night, and patient also feels very dizzy.     Patient reports he had a problem with the same ear a few months ago, saw Josafat Al MD at [facility] (ENT) - last visit was 10/21/24.     Patient was prescribed antibiotic and his ear improved until last night.   Patient stated he tried to call Dr. Al, but he cannot reach anyone for appointment.    Patient stated he would prefer appointment with NAVNEET Garcia, he last saw him 9/6/24.     Appointment scheduled for today at 1:00 PM.     Reason for Disposition   All other earaches  (Exceptions: Earache lasting < 1 hour, and earache from air travel.)    Protocols used: Earache-A-[place]

## 2025-01-16 NOTE — PROGRESS NOTES
Subjective:   Russell Ellis is a 69 year old male who presents for Ear Problem (Left ear, pt thinks has infection, its itchy, past 2 days)   Patient is a pleasant 69-year-old male with past medical history significant for BPH, cervical radiculopathy, spinal stenosis of lumbar spine, tortuous aorta, and obesity. Patient presents to office today for evaluation of left ear problems. Of note last seen 24 and impacted cerumen he requested referral to ENT for removal. He saw ENT out of [facility] and was diagnosed with cholesteatoma and referred for audiogram which was completed 10/21/24. He was advised follow up with Dr Al and referred for hearing aides. Patient states today he has been having itching in left ear for last couple days and dizziness. He has dizziness whenever he changes position suddenly. He called his [facility] and is waiting to hear back but wanted to make sure he didn't have an infection. Advised follow up with ENT if persists. Likely related to BPPV by description. Educated on Barany maneuver and provided with rx for meclizine and vestibular therapy. Patient states understanding.         Past Medical History:    Fall    Right arm pain    Sore throat      History reviewed. No pertinent surgical history.     History/Other:    Chief Complaint Reviewed and Verified  Nursing Notes Reviewed and   Verified  Tobacco Reviewed  Allergies Reviewed  Medications Reviewed    Medical History Reviewed  Surgical History Reviewed  Family History   Reviewed  Social History Reviewed         Tobacco:  He smoked tobacco in the past but quit greater than 12 months ago.  Social History     Tobacco Use   Smoking Status Former    Current packs/day: 0.00    Types: Cigarettes    Quit date: 2023    Years since quittin.3    Passive exposure: Never   Smokeless Tobacco Never        Current Outpatient Medications   Medication Sig Dispense Refill    meclizine 12.5 MG Oral Tab Take 1 tablet  (12.5 mg total) by mouth 3 (three) times daily as needed. 30 tablet 0    tamsulosin 0.4 MG Oral Cap Take 1 capsule (0.4 mg total) by mouth daily. 90 capsule 3         Review of Systems:  Review of Systems   Constitutional:  Negative for chills and fever.   HENT:  Negative for ear pain and tinnitus.    Respiratory:  Negative for chest tightness and shortness of breath.    Cardiovascular:  Negative for chest pain.   Neurological:  Positive for dizziness.         Objective:   /63 (BP Location: Right arm, Patient Position: Sitting, Cuff Size: adult)   Pulse 94   Ht 5' 6\" (1.676 m)   Wt 217 lb 9.6 oz (98.7 kg)   SpO2 96%   BMI 35.12 kg/m²  Estimated body mass index is 35.12 kg/m² as calculated from the following:    Height as of this encounter: 5' 6\" (1.676 m).    Weight as of this encounter: 217 lb 9.6 oz (98.7 kg).  Physical Exam  Vitals and nursing note reviewed.   Constitutional:       Appearance: Normal appearance. He is normal weight.   HENT:      Head: Normocephalic and atraumatic.      Right Ear: Ear canal and external ear normal. There is no impacted cerumen.      Left Ear: Ear canal and external ear normal. There is no impacted cerumen.      Ears:      Comments: Cholesteatoma left ear  Scarring bilateral membranes  Both intact  No erythema or swelling       Nose: Congestion and rhinorrhea present.   Cardiovascular:      Rate and Rhythm: Normal rate and regular rhythm.      Pulses: Normal pulses.      Heart sounds: Normal heart sounds. No murmur heard.  Pulmonary:      Effort: Pulmonary effort is normal.      Breath sounds: Normal breath sounds.   Skin:     Capillary Refill: Capillary refill takes less than 2 seconds.   Neurological:      Mental Status: He is alert and oriented to person, place, and time.           Assessment & Plan:   1. Cholesteatoma of ear, left (Primary)  2. Ear itching  -     Meclizine HCl; Take 1 tablet (12.5 mg total) by mouth 3 (three) times daily as needed.  Dispense: 30  tablet; Refill: 0  3. Benign paroxysmal positional vertigo, unspecified laterality  -     Meclizine HCl; Take 1 tablet (12.5 mg total) by mouth 3 (three) times daily as needed.  Dispense: 30 tablet; Refill: 0  -     PHYSICAL THERAPY - INTERNAL    Advised symptoms are likely secondary to cholesteatoma  Recommend trail of meclizine tid prn  Follow up with established ENT for cholesteatoma  Provided with instructions for pawan ballard  Vestibular therapy written    Patient aware of plan of care. All questions answered to satisfaction of the patient. Patient instructed to call office or reach out via [employer] if any issues arise. For urgent issues and/or reviewed red flags please proceed to the urgent care or ER.  Also, inform the nurse practitioner with any new symptoms or medication side effects.      Return if symptoms worsen or fail to improve.    Elias Garcia, NAVNEET, 1/16/2025, 11:29 AM

## 2025-02-20 ENCOUNTER — TELEPHONE (OUTPATIENT)
Dept: FAMILY MEDICINE CLINIC | Facility: CLINIC | Age: 70
End: 2025-02-20

## 2025-07-22 ENCOUNTER — TELEPHONE (OUTPATIENT)
Dept: FAMILY MEDICINE CLINIC | Facility: CLINIC | Age: 70
End: 2025-07-22

## (undated) NOTE — LETTER
12/18/2023              Rosanna Queen         Dear Danie Chacon,    This letter is to inform you that our office has made several attempts to reach you by phone without success. We were attempting to contact you by phone regarding your call to us on 12/14/23. Please contact our office at the number listed below as soon as you receive this letter to discuss this issue and to make the necessary changes in our system to your contact information. Thank you for your cooperation.         Sincerely,    Slim Saldaña MD  [phone]        Document electronically generated by:  Javier Morris RN

## (undated) NOTE — Clinical Note
Dear Car,  I had the opportunity to see your patient Russell Ellis for an EMG recently. I appreciate your confidence in me to care for your patients. Please feel free call me with any questions at [phone] or contact me through Epic.  Sincerely, Wai Patton MD Board Certified, Physical Medicine and Rehabilitation Specializing in Sports Medicine, Spine Medicine and Electrodiagnostic Medicine [facility]

## (undated) NOTE — Clinical Note
Dear Car,  I had the opportunity to see your patient Russell Ellis recently. I appreciate your confidence in me to care for your patients. Please feel free call me with any questions at [phone] or contact me through Epic.  Sincerely, Wai Patton MD Board Certified, Physical Medicine and Rehabilitation Specializing in Sports Medicine, Spine Medicine and Electrodiagnostic Medicine [facility]

## (undated) NOTE — LETTER
[facility]   Date:   1/31/2024     Name:   Russell Ellis    YOB: 1955   MRN:   [id number]       WHERE IS YOUR PAIN NOW?  Asim the areas on your body where you feel the described sensations.  Use the appropriate symbol.  Asim the areas of radiation.  Include all affected areas.  Just to complete the picture, please draw in the face.     ACHE:  ^ ^ ^   NUMBNESS:  0000   PINS & NEEDLES:  = = = =                              ^ ^ ^                       0000              = = = =                                    ^ ^ ^                       0000            = = = =      BURNING:  XXXX   STABBING: ////                  XXXX                ////                         XXXX          ////     Please asim the line below indicating your degree of pain right now  with 0 being no pain 10 being the worst pain possible.                                         0             1             2              3             4              5              6              7             8             9             10         Patient Signature: